# Patient Record
Sex: FEMALE | Race: WHITE | NOT HISPANIC OR LATINO | Employment: FULL TIME | URBAN - METROPOLITAN AREA
[De-identification: names, ages, dates, MRNs, and addresses within clinical notes are randomized per-mention and may not be internally consistent; named-entity substitution may affect disease eponyms.]

---

## 2022-02-25 NOTE — PROGRESS NOTES
"Chief Complaint  Establish Care (Pt states that she would like to dicuss getting labs done, gi issues.) and GI Problem    Subjective          Dominique Huggins presents to [facility]  GI issues since she had her daughter 4 years ago. She reports that she has fecal urgency. She started taking probiotics, and her diarrhea gradually improved.     She would like to get her thyroid and vitamin D checked.  She is feeling more fatigue lately.  She was on thyroid medication while she was pregnant.    GI Problem  The primary symptoms include abdominal pain, nausea, vomiting and diarrhea. Primary symptoms do not include fever, weight loss, hematemesis or hematochezia. The illness began more than 7 days ago. The onset was gradual. The problem has been gradually worsening.   The abdominal pain began more than 2 days ago. The abdominal pain has been unchanged since its onset. The abdominal pain is generalized. The abdominal pain does not radiate. Relieved by: Pepto-Bismal.   Nausea began more than 1 week ago. Associated with: nothing.   The vomiting began more than 2 days ago. Frequency: occasoinal. The emesis contains bilious material.   The diarrhea began more than 1 week ago. The diarrhea is watery. Daily occurrences: few times per week.   The illness is also significant for constipation. The illness does not include bloating. Associated medical issues do not include inflammatory bowel disease, GERD or irritable bowel syndrome.       Objective   Vital Signs:   BP 99/46 (BP Location: Left arm, Patient Position: Sitting)   Pulse 89   Temp 98.1 °F (36.7 °C) (Oral)   Ht 162.6 cm (64\")   Wt 59.7 kg (131 lb 9.6 oz)   SpO2 99% Comment: room air  BMI 22.59 kg/m²     Physical Exam  Constitutional:       General: She is not in acute distress.     Appearance: Normal appearance. She is normal weight.   HENT:      Head: Normocephalic.   Eyes:      Pupils: Pupils are equal, round, and reactive to " light.      Visual Fields: Right eye visual fields normal and left eye visual fields normal.   Neck:      Trachea: Trachea normal.   Cardiovascular:      Rate and Rhythm: Normal rate and regular rhythm.      Heart sounds: Normal heart sounds.   Pulmonary:      Effort: Pulmonary effort is normal.      Breath sounds: Normal breath sounds and air entry.   Abdominal:      General: Abdomen is flat. Bowel sounds are normal. There is no distension.      Palpations: Abdomen is soft. There is no mass.      Tenderness: There is no abdominal tenderness.   Musculoskeletal:      Right lower leg: No edema.      Left lower leg: No edema.   Skin:     General: Skin is warm and dry.   Neurological:      Mental Status: She is alert and oriented to person, place, and time.   Psychiatric:         Mood and Affect: Mood and affect normal.         Behavior: Behavior normal.         Thought Content: Thought content normal.        Result Review :   The following data was reviewed by: ONEYDA Guidry on 02/28/2022:                  Assessment and Plan    Diagnoses and all orders for this visit:    1. Altered bowel habits (Primary)  -     dicyclomine (BENTYL) 20 MG tablet; Take 1 tablet by mouth 3 (Three) Times a Day As Needed (abdominal pain).  Dispense: 90 tablet; Refill: 0  -     Ambulatory Referral to Gastroenterology  -     CBC (No Diff); Future  -     Comprehensive Metabolic Panel; Future    2. Nausea and vomiting, intractability of vomiting not specified, unspecified vomiting type  -     CBC (No Diff); Future  -     Comprehensive Metabolic Panel; Future    3. Other fatigue  -     TSH; Future  -     Vitamin D 25 Hydroxy; Future    4. Screening for cholesterol level  -     Lipid Panel; Future    It sounds like she may have irritable bowel syndrome.  We will give her a trial of dicyclomine and refer her to gastro due to her nausea and vomiting.  Will check CBC, CMP, TSH, vitamin D, and lipid panel.  We will have her return in 1  month to see if dicyclomine is effective.      Follow Up   Return in about 4 weeks (around 3/28/2022).  Patient was given instructions and counseling regarding her condition or for health maintenance advice. Please see specific information pulled into the AVS if appropriate.

## 2022-02-28 ENCOUNTER — LAB (OUTPATIENT)
Dept: LAB | Facility: HOSPITAL | Age: 27
End: 2022-02-28

## 2022-02-28 ENCOUNTER — OFFICE VISIT (OUTPATIENT)
Dept: FAMILY MEDICINE CLINIC | Age: 27
End: 2022-02-28

## 2022-02-28 VITALS
BODY MASS INDEX: 22.47 KG/M2 | SYSTOLIC BLOOD PRESSURE: 99 MMHG | WEIGHT: 131.6 LBS | HEART RATE: 89 BPM | HEIGHT: 64 IN | TEMPERATURE: 98.1 F | DIASTOLIC BLOOD PRESSURE: 46 MMHG | OXYGEN SATURATION: 99 %

## 2022-02-28 DIAGNOSIS — R53.83 OTHER FATIGUE: ICD-10-CM

## 2022-02-28 DIAGNOSIS — Z13.220 SCREENING FOR CHOLESTEROL LEVEL: ICD-10-CM

## 2022-02-28 DIAGNOSIS — R19.4 ALTERED BOWEL HABITS: Primary | ICD-10-CM

## 2022-02-28 DIAGNOSIS — R19.4 ALTERED BOWEL HABITS: ICD-10-CM

## 2022-02-28 DIAGNOSIS — R11.2 NAUSEA AND VOMITING, INTRACTABILITY OF VOMITING NOT SPECIFIED, UNSPECIFIED VOMITING TYPE: ICD-10-CM

## 2022-02-28 PROBLEM — J45.20 MILD INTERMITTENT ASTHMA WITHOUT COMPLICATION: Status: ACTIVE | Noted: 2022-02-28

## 2022-02-28 LAB
25(OH)D3 SERPL-MCNC: 30.2 NG/ML
ALBUMIN SERPL-MCNC: 4.7 G/DL (ref 3.5–5.2)
ALBUMIN/GLOB SERPL: 1.7 G/DL
ALP SERPL-CCNC: 61 U/L (ref 39–117)
ALT SERPL W P-5'-P-CCNC: 8 U/L (ref 1–33)
ANION GAP SERPL CALCULATED.3IONS-SCNC: 7.7 MMOL/L (ref 5–15)
AST SERPL-CCNC: 11 U/L (ref 1–32)
BILIRUB SERPL-MCNC: 0.7 MG/DL (ref 0–1.2)
BUN SERPL-MCNC: 8 MG/DL (ref 6–20)
BUN/CREAT SERPL: 11.8 (ref 7–25)
CALCIUM SPEC-SCNC: 9.5 MG/DL (ref 8.6–10.5)
CHLORIDE SERPL-SCNC: 106 MMOL/L (ref 98–107)
CHOLEST SERPL-MCNC: 150 MG/DL (ref 0–200)
CO2 SERPL-SCNC: 26.3 MMOL/L (ref 22–29)
CREAT SERPL-MCNC: 0.68 MG/DL (ref 0.57–1)
DEPRECATED RDW RBC AUTO: 37.9 FL (ref 37–54)
EGFRCR SERPLBLD CKD-EPI 2021: 122.6 ML/MIN/1.73
ERYTHROCYTE [DISTWIDTH] IN BLOOD BY AUTOMATED COUNT: 11.7 % (ref 12.3–15.4)
GLOBULIN UR ELPH-MCNC: 2.8 GM/DL
GLUCOSE SERPL-MCNC: 93 MG/DL (ref 65–99)
HCT VFR BLD AUTO: 42.9 % (ref 34–46.6)
HDLC SERPL-MCNC: 44 MG/DL (ref 40–60)
HGB BLD-MCNC: 14.1 G/DL (ref 12–15.9)
LDLC SERPL CALC-MCNC: 96 MG/DL (ref 0–100)
LDLC/HDLC SERPL: 2.2 {RATIO}
MCH RBC QN AUTO: 28.5 PG (ref 26.6–33)
MCHC RBC AUTO-ENTMCNC: 32.9 G/DL (ref 31.5–35.7)
MCV RBC AUTO: 86.8 FL (ref 79–97)
PLATELET # BLD AUTO: 276 10*3/MM3 (ref 140–450)
PMV BLD AUTO: 10 FL (ref 6–12)
POTASSIUM SERPL-SCNC: 4.7 MMOL/L (ref 3.5–5.2)
PROT SERPL-MCNC: 7.5 G/DL (ref 6–8.5)
RBC # BLD AUTO: 4.94 10*6/MM3 (ref 3.77–5.28)
SODIUM SERPL-SCNC: 140 MMOL/L (ref 136–145)
TRIGL SERPL-MCNC: 46 MG/DL (ref 0–150)
TSH SERPL DL<=0.05 MIU/L-ACNC: 0.89 UIU/ML (ref 0.27–4.2)
VLDLC SERPL-MCNC: 10 MG/DL (ref 5–40)
WBC NRBC COR # BLD: 4.05 10*3/MM3 (ref 3.4–10.8)

## 2022-02-28 PROCEDURE — 36415 COLL VENOUS BLD VENIPUNCTURE: CPT

## 2022-02-28 PROCEDURE — 99203 OFFICE O/P NEW LOW 30 MIN: CPT | Performed by: NURSE PRACTITIONER

## 2022-02-28 PROCEDURE — 80053 COMPREHEN METABOLIC PANEL: CPT

## 2022-02-28 PROCEDURE — 82306 VITAMIN D 25 HYDROXY: CPT

## 2022-02-28 PROCEDURE — 80061 LIPID PANEL: CPT

## 2022-02-28 PROCEDURE — 85027 COMPLETE CBC AUTOMATED: CPT

## 2022-02-28 PROCEDURE — 84443 ASSAY THYROID STIM HORMONE: CPT

## 2022-02-28 RX ORDER — ALBUTEROL SULFATE 90 UG/1
2 AEROSOL, METERED RESPIRATORY (INHALATION) EVERY 4 HOURS PRN
COMMUNITY

## 2022-02-28 RX ORDER — DIPHENOXYLATE HYDROCHLORIDE AND ATROPINE SULFATE 2.5; .025 MG/1; MG/1
TABLET ORAL DAILY
COMMUNITY

## 2022-02-28 RX ORDER — DICYCLOMINE HCL 20 MG
20 TABLET ORAL 3 TIMES DAILY PRN
Qty: 90 TABLET | Refills: 0 | Status: SHIPPED | OUTPATIENT
Start: 2022-02-28 | End: 2022-04-26 | Stop reason: SDUPTHER

## 2022-02-28 NOTE — PATIENT INSTRUCTIONS
Dicyclomine tablets or capsules  What is this medicine?  DICYCLOMINE (dye MAUREENE eda rice) is used to treat bowel problems including irritable bowel syndrome.  This medicine may be used for other purposes; ask your health care provider or pharmacist if you have questions.  COMMON BRAND NAME(S): Bentyl  What should I tell my health care provider before I take this medicine?  They need to know if you have any of these conditions:  · difficulty passing urine  · esophagus problems or heartburn  · glaucoma  · heart disease, or previous heart attack  · myasthenia gravis  · prostate trouble  · stomach infection, or obstruction  · ulcerative colitis  · an unusual or allergic reaction to dicyclomine, other medicines, foods, dyes, or preservatives  · pregnant or trying to get pregnant  · breast-feeding  How should I use this medicine?  Take this medicine by mouth with a glass of water. Follow the directions on the prescription label. It is best to take this medicine on an empty stomach, 30 minutes to 1 hour before meals. Take your medicine at regular intervals. Do not take your medicine more often than directed.  Talk to your pediatrician regarding the use of this medicine in children. Special care may be needed. While this drug may be prescribed for children as young as 6 months of age for selected conditions, precautions do apply.  Patients over 65 years old may have a stronger reaction and need a smaller dose.  Overdosage: If you think you have taken too much of this medicine contact a poison control center or emergency room at once.  NOTE: This medicine is only for you. Do not share this medicine with others.  What if I miss a dose?  If you miss a dose, take it as soon as you can. If it is almost time for your next dose, take only that dose. Do not take double or extra doses.  What may interact with this medicine?  · amantadine  · antacids  · benztropine  · digoxin  · disopyramide  · medicines for allergies, colds and  breathing difficulties  · medicines for alzheimer's disease  · medicines for anxiety or sleeping problems  · medicines for depression or psychotic disturbances  · medicines for diarrhea  · medicines for pain  · metoclopramide  · tegaserod  This list may not describe all possible interactions. Give your health care provider a list of all the medicines, herbs, non-prescription drugs, or dietary supplements you use. Also tell them if you smoke, drink alcohol, or use illegal drugs. Some items may interact with your medicine.  What should I watch for while using this medicine?  You may get drowsy, dizzy, or have blurred vision. Do not drive, use machinery, or do anything that needs mental alertness until you know how this medicine affects you. To reduce the risk of dizzy or fainting spells, do not sit or stand up quickly, especially if you are an older patient. Alcohol can make you more drowsy, avoid alcoholic drinks.  Stay out of bright light and wear sunglasses if this medicine makes your eyes more sensitive to light.  Avoid extreme heat (hot tubs, saunas). This medicine can cause you to sweat less than normal. Your body temperature could increase to dangerous levels, which may lead to heat stroke.  Antacids can stop this medicine from working. If you get an upset stomach and want to take an antacid, make sure there is an interval of at least 1 to 2 hours before or after you take this medicine.  Your mouth may get dry. Chewing sugarless gum or sucking hard candy, and drinking plenty of water may help. Contact your doctor if the problem does not go away or is severe.  What side effects may I notice from receiving this medicine?  Side effects that you should report to your doctor or health care professional as soon as possible:  · agitation, nervousness, confusion  · difficulty swallowing  · dizziness, drowsiness  · fast or slow heartbeat  · hallucinations  · pain or difficulty passing urine  Side effects that usually do  not require medical attention (report to your doctor or health care professional if they continue or are bothersome):  · constipation  · headache  · nausea or vomiting  · sexual difficulty  This list may not describe all possible side effects. Call your doctor for medical advice about side effects. You may report side effects to FDA at 1-246-XAP-8497.  Where should I keep my medicine?  Keep out of the reach of children.  Store at room temperature below 30 degrees C (86 degrees F). Protect from light. Throw away any unused medicine after the expiration date.  NOTE: This sheet is a summary. It may not cover all possible information. If you have questions about this medicine, talk to your doctor, pharmacist, or health care provider.  © 2021 [employer]vier/Gold Standard (2009-04-07 17:12:34)

## 2022-03-28 ENCOUNTER — OFFICE VISIT (OUTPATIENT)
Dept: GASTROENTEROLOGY | Facility: CLINIC | Age: 27
End: 2022-03-28

## 2022-03-28 VITALS
SYSTOLIC BLOOD PRESSURE: 104 MMHG | BODY MASS INDEX: 22.09 KG/M2 | WEIGHT: 132.6 LBS | DIASTOLIC BLOOD PRESSURE: 62 MMHG | OXYGEN SATURATION: 99 % | HEART RATE: 98 BPM | HEIGHT: 65 IN

## 2022-03-28 DIAGNOSIS — R11.2 NAUSEA AND VOMITING, INTRACTABILITY OF VOMITING NOT SPECIFIED, UNSPECIFIED VOMITING TYPE: ICD-10-CM

## 2022-03-28 DIAGNOSIS — R12 HEARTBURN: ICD-10-CM

## 2022-03-28 DIAGNOSIS — R19.8 ALTERNATING CONSTIPATION AND DIARRHEA: Primary | ICD-10-CM

## 2022-03-28 DIAGNOSIS — T78.1XXA FOOD SENSITIVITY WITH GASTROINTESTINAL SYMPTOMS: ICD-10-CM

## 2022-03-28 PROCEDURE — 99214 OFFICE O/P EST MOD 30 MIN: CPT | Performed by: NURSE PRACTITIONER

## 2022-03-28 RX ORDER — AMITRIPTYLINE HYDROCHLORIDE 10 MG/1
10 TABLET, FILM COATED ORAL NIGHTLY
Qty: 90 TABLET | Refills: 1 | Status: SHIPPED | OUTPATIENT
Start: 2022-03-28 | End: 2022-05-26

## 2022-03-28 RX ORDER — PANTOPRAZOLE SODIUM 20 MG/1
20 TABLET, DELAYED RELEASE ORAL DAILY
Qty: 30 TABLET | Refills: 3 | Status: SHIPPED | OUTPATIENT
Start: 2022-03-28 | End: 2022-05-24

## 2022-03-28 NOTE — PROGRESS NOTES
"Patient Name: Dominique Huggins   Visit Date: 03/28/2022   Patient ID: [id number]  Provider: ONEYDA Champion    Sex: female  Location:  Location Address:  Location Phone: [phone] [facility] [address] [facility] [address]  [facility] [address] [phone]    YOB: 1995      Primary Care Provider Mulu Loera APRN      Referring Provider: Mulu Alba*        Chief Complaint  GI Problem and Heartburn (NEW PATIENT, HEARTBURN,NAUSEA IN THE MORNING WITH BLOATING)    History of Present Illness  Dominique Huggins is a 26 y.o. who presents to [facility] on referral from Mulu Hung for a gastroenterology evaluation of GI Problem and Heartburn (NEW PATIENT, HEARTBURN,NAUSEA IN THE MORNING WITH BLOATING).    Ms. Huggins presents today with GI complaints since being pregnant with her son 2 years ago. She also notes developing covid around 30 weeks pregnant with son. Episodes of diarrhea and constipation, more so diarrhea. PCP prescribed dicylcomine and she has noticed a decrease in diarrhea however reports is has not \"resolved everything\". Admits to being under a lot of stress due to having multiple responsibilities to juggle.  She notices episodes of having trouble sleeping at night due to a \"racing mind\" and the next day her GI issues tend to be more intense.  Expressed that she \"D-Worm herself\" 3 weeks ago as she is a . Denies any hematochezia or melena.    Recently has developed heartburn as well. Episodes multiple times a day. Persistant nausea, worse first thing in the morning with episodes of vomiting. At first she thought she was pregnant however she is has an IUD and has taken multiple negative pregrancy test. Tomato based foods increase episodes. Recalls when she was pregnant with her son that starch based foods caused her tongue to break out. Sore throat comes and goes.  Has found relief with over-the-counter omeprazole.  Denies " "any dysphagia or frequent NSAID usage.  Appetite and weight stable.    Labs Result Review Imaging    Past Medical History:   Diagnosis Date   • PCOS (polycystic ovarian syndrome)        Past Surgical History:   Procedure Laterality Date   • DILATATION AND CURETTAGE           Current Outpatient Medications:   •  albuterol sulfate  (90 Base) MCG/ACT inhaler, Inhale 2 puffs Every 4 (Four) Hours As Needed., Disp: , Rfl:   •  dicyclomine (BENTYL) 20 MG tablet, Take 1 tablet by mouth 3 (Three) Times a Day As Needed (abdominal pain)., Disp: 90 tablet, Rfl: 0  •  multivitamin (THERAGRAN) tablet tablet, Take  by mouth Daily., Disp: , Rfl:   •  VITAMIN D PO, Take  by mouth., Disp: , Rfl:   •  amitriptyline (ELAVIL) 10 MG tablet, Take 1 tablet by mouth Every Night., Disp: 90 tablet, Rfl: 1  •  pantoprazole (PROTONIX) 20 MG EC tablet, Take 1 tablet by mouth Daily., Disp: 30 tablet, Rfl: 3     No Known Allergies    Family History   Problem Relation Age of Onset   • Hyperlipidemia Mother    • Hyperlipidemia Father    • Colon cancer Maternal Grandmother    • Cervical cancer Maternal Grandmother    • Colon cancer Paternal Grandfather         Social History     Social History Narrative    . 2 kids.          Objective     Review of Systems   Constitutional: Negative for appetite change and unexpected weight loss.   Gastrointestinal: Positive for constipation, diarrhea, nausea, vomiting and GERD.        Vital Signs:   /62 (BP Location: Right arm, Patient Position: Sitting, Cuff Size: Large Adult)   Pulse 98   Ht 165.1 cm (65\")   Wt 60.1 kg (132 lb 9.6 oz)   SpO2 99%   BMI 22.07 kg/m²       Physical Exam  Constitutional:       General: She is not in acute distress.     Appearance: Normal appearance. She is well-developed and normal weight.   HENT:      Head: Normocephalic and atraumatic.   Eyes:      Conjunctiva/sclera: Conjunctivae normal.      Pupils: Pupils are equal, round, and reactive to light.    "   Visual Fields: Right eye visual fields normal and left eye visual fields normal.   Cardiovascular:      Rate and Rhythm: Normal rate and regular rhythm.      Heart sounds: Normal heart sounds.   Pulmonary:      Effort: Pulmonary effort is normal. No retractions.      Breath sounds: Normal breath sounds and air entry.   Abdominal:      General: Bowel sounds are normal. There is no distension.      Palpations: Abdomen is soft.      Tenderness: There is no abdominal tenderness.      Comments: No appreciable hepatosplenomegaly or ascites   Musculoskeletal:         General: Normal range of motion.      Cervical back: Normal range of motion and neck supple.   Skin:     General: Skin is warm and dry.   Neurological:      Mental Status: She is alert and oriented to person, place, and time.   Psychiatric:         Mood and Affect: Mood and affect normal.         Behavior: Behavior normal.         Result Review :   The following data was reviewed by: ONEYDA Champion on 03/28/2022:  CBC w/diff    CBC w/Diff 4/13/21 2/28/22   WBC 4.94 4.05   RBC 4.85 4.94   Hemoglobin 13.5 14.1   Hematocrit 41.5 42.9   MCV 85.6 86.8   MCH 27.8 28.5   MCHC 32.5 32.9   RDW 12.5 11.7 (A)   Platelets 281 276   Neutrophil Rel % 62.6    Immature Granulocyte Rel % 0.2    Lymphocyte Rel % 29.1    Monocyte Rel % 6.3    Eosinophil Rel % 1.0    Basophil Rel % 0.8    (A) Abnormal value            CMP    CMP 2/28/22   Glucose 93   BUN 8   Creatinine 0.68   Sodium 140   Potassium 4.7   Chloride 106   Calcium 9.5   Albumin 4.70   Total Bilirubin 0.7   Alkaline Phosphatase 61   AST (SGOT) 11   ALT (SGPT) 8           Lipase   Date Value Ref Range Status   04/13/2021 84 23 - 300 U/L Final     Hepatitis B Surface Ag   Date Value Ref Range Status   11/07/2019 Nonreactive Nonreactive Final     External Hepatitis C Ab   Date Value Ref Range Status   11/07/2019 Nonreactive Nonreactive Final     Comment:     Nonreactive   Antibodies to HCV not detected, does  not exclude the possiblitly of exposure to HCV     Protime   Date Value Ref Range Status   05/19/2020 11.5 10.3 - 13.3 s Final     Comment:     (note)  Anticoagulants may alter the results of Laboratory   Coagulation assays. New-generation anticoagulants such as   direct Thrombin inhibitors (Dabigatran/Pradaxa, Argatroban,   Bivalrudin) and direct/indirect factor Xa inhibitors   (Rivaroxaban/Xarelto,Apixaban/Eliquis, Danaparoid/Orgaran,   Fondaparinux/Arixtra) have been shown to affect the results   of Laboratory Coagulation assays, creating falsely elevated   or decreased values. Correlation with medication history is   advised.     INR   Date Value Ref Range Status   05/19/2020 1.0 INR Final     Comment:     INR Therapeutic Ranges:  Low Intensity Range: 2.0-3.0  High Intensity Range:  3.0-4.5             Assessment and Plan    Diagnoses and all orders for this visit:    1. Alternating constipation and diarrhea (Primary)  -     Clostridium Difficile Toxin - Stool, Per Rectum; Future  -     Enteric Bacterial Panel - Stool, Per Rectum; Future  -     Enteric Parasite Panel - Stool, Per Rectum; Future  -     Fecal Lactoferrin Qual. - Stool, Per Rectum; Future  -     Pancreatic Elastase, Fecal - Stool, Per Rectum; Future    2. Food sensitivity with gastrointestinal symptoms  -     Food Allergy Profile; Future    3. Nausea and vomiting, intractability of vomiting not specified, unspecified vomiting type    4. Heartburn    Other orders  -     pantoprazole (PROTONIX) 20 MG EC tablet; Take 1 tablet by mouth Daily.  Dispense: 30 tablet; Refill: 3  -     amitriptyline (ELAVIL) 10 MG tablet; Take 1 tablet by mouth Every Night.  Dispense: 90 tablet; Refill: 1      * Surgery not found *     Patient would like to hold off on scopes at this time due to financial reasons.  If no change in symptoms or worsening symptoms we will revisit EGD and colonoscopy.    Follow Up   Return in about 3 months (around 6/28/2022).  Patient was  given instructions and counseling regarding her condition or for health maintenance advice. Please see specific information pulled into the AVS if appropriate.

## 2022-04-06 ENCOUNTER — PATIENT ROUNDING (BHMG ONLY) (OUTPATIENT)
Dept: GASTROENTEROLOGY | Facility: CLINIC | Age: 27
End: 2022-04-06

## 2022-04-06 NOTE — PROGRESS NOTES
April 6, 2022    Hello, may I speak with Dominique Huggins?    My name is Pam      I am  with [facility]  [address] [address]  [facility] [address] [phone].    Before we get started may I verify your date of birth? 1995    I am calling to officially welcome you to our practice and ask about your recent visit. Is this a good time to talk? Yes     Tell me about your visit with us. What things went well?  Everything was great     We're always looking for ways to make our patients' experiences even better. Do you have recommendations on ways we may improve? No    Overall were you satisfied with your first visit to our practice? Yes       I appreciate you taking the time to speak with me today. Is there anything else I can do for you? No      Thank you, and have a great day.

## 2022-04-13 ENCOUNTER — TELEPHONE (OUTPATIENT)
Dept: GASTROENTEROLOGY | Facility: CLINIC | Age: 27
End: 2022-04-13

## 2022-04-13 NOTE — TELEPHONE ENCOUNTER
Left message with patient asking for a returned call to follow up on overdue results for labs and stool studies.

## 2022-04-25 NOTE — PROGRESS NOTES
"Chief Complaint  Sexual Problem (Pt c/o low libido since having her son 2 years ago./)    Subjective     {Problem List  Visit Diagnosis   Encounters  Notes  Medications  Labs  Result Review Imaging  Media :23}     Dominique Huggins presents to [facility]  She reports low libido since the birth of her child 2 years ago.  She reports not having desire to have sexual activity with her spouse.  She reports that she used to be more interested in sex than she currently has.  She reports she has a lot of fatigue.  She does have 2 children, which are 4 and 2, and does have a job.  She reports that she should be happy, but she is not as happy as she wants once.    She is needing a refill on her dicyclomine.    Objective   Vital Signs:   BP 99/64 (BP Location: Left arm, Patient Position: Sitting)   Pulse 100   Ht 165.1 cm (65\")   Wt 59.3 kg (130 lb 12.8 oz)   BMI 21.77 kg/m²     Physical Exam  Constitutional:       General: She is not in acute distress.     Appearance: Normal appearance. She is normal weight.   HENT:      Head: Normocephalic.   Eyes:      Pupils: Pupils are equal, round, and reactive to light.      Visual Fields: Right eye visual fields normal and left eye visual fields normal.   Neck:      Trachea: Trachea normal.   Cardiovascular:      Rate and Rhythm: Normal rate and regular rhythm.      Heart sounds: Normal heart sounds.   Pulmonary:      Effort: Pulmonary effort is normal.      Breath sounds: Normal breath sounds and air entry.   Musculoskeletal:      Right lower leg: No edema.      Left lower leg: No edema.   Skin:     General: Skin is warm and dry.   Neurological:      Mental Status: She is alert and oriented to person, place, and time.   Psychiatric:         Mood and Affect: Mood and affect normal.         Behavior: Behavior normal.         Thought Content: Thought content normal.        Result Review :   The following data was reviewed by: Mulu" ONEYDA Loera on 04/26/2022:  Lipid Panel (02/28/2022 10:20)  Comprehensive Metabolic Panel (02/28/2022 10:20)  Vitamin D 25 Hydroxy (02/28/2022 10:20)  TSH (02/28/2022 10:20)                   Assessment and Plan    Diagnoses and all orders for this visit:    1. Low libido (Primary)  Assessment & Plan:  I believe her low libido is due to having a lot of stress in her life, putting a strain on her libido.  She is a mother of 2 young children and has a job.  Also believes she may have mild depression, she reports she is not as happy as she once was, even though she should be.  We will try her on Wellbutrin 150 mg twice daily to see if that will help improve her overall mood and see if it will improve her libido as well.    Orders:  -     buPROPion SR (Wellbutrin SR) 150 MG 12 hr tablet; Take 1 tablet by mouth 2 (Two) Times a Day for 60 days.  Dispense: 120 tablet; Refill: 0    2. Altered bowel habits  -     dicyclomine (BENTYL) 20 MG tablet; Take 1 tablet by mouth 3 (Three) Times a Day As Needed (abdominal pain).  Dispense: 90 tablet; Refill: 1    3. Major depressive disorder, single episode, mild (HCC)  -     buPROPion SR (Wellbutrin SR) 150 MG 12 hr tablet; Take 1 tablet by mouth 2 (Two) Times a Day for 60 days.  Dispense: 120 tablet; Refill: 0        Follow Up   Return in about 6 weeks (around 6/7/2022).  Patient was given instructions and counseling regarding her condition or for health maintenance advice. Please see specific information pulled into the AVS if appropriate.

## 2022-04-26 ENCOUNTER — OFFICE VISIT (OUTPATIENT)
Dept: FAMILY MEDICINE CLINIC | Age: 27
End: 2022-04-26

## 2022-04-26 VITALS
HEIGHT: 65 IN | SYSTOLIC BLOOD PRESSURE: 99 MMHG | WEIGHT: 130.8 LBS | HEART RATE: 100 BPM | DIASTOLIC BLOOD PRESSURE: 64 MMHG | BODY MASS INDEX: 21.79 KG/M2

## 2022-04-26 DIAGNOSIS — F32.0 MAJOR DEPRESSIVE DISORDER, SINGLE EPISODE, MILD: ICD-10-CM

## 2022-04-26 DIAGNOSIS — R19.4 ALTERED BOWEL HABITS: ICD-10-CM

## 2022-04-26 DIAGNOSIS — R68.82 LOW LIBIDO: Primary | ICD-10-CM

## 2022-04-26 PROCEDURE — 99213 OFFICE O/P EST LOW 20 MIN: CPT | Performed by: NURSE PRACTITIONER

## 2022-04-26 RX ORDER — BUPROPION HYDROCHLORIDE 150 MG/1
150 TABLET, EXTENDED RELEASE ORAL 2 TIMES DAILY
Qty: 120 TABLET | Refills: 0 | Status: SHIPPED | OUTPATIENT
Start: 2022-04-26 | End: 2022-06-07 | Stop reason: SDUPTHER

## 2022-04-26 RX ORDER — DICYCLOMINE HCL 20 MG
20 TABLET ORAL 3 TIMES DAILY PRN
Qty: 90 TABLET | Refills: 1 | Status: SHIPPED | OUTPATIENT
Start: 2022-04-26

## 2022-04-26 NOTE — ASSESSMENT & PLAN NOTE
I believe her low libido is due to having a lot of stress in her life, putting a strain on her libido.  She is a mother of 2 young children and has a job.  Also believes she may have mild depression, she reports she is not as happy as she once was, even though she should be.  We will try her on Wellbutrin 150 mg twice daily to see if that will help improve her overall mood and see if it will improve her libido as well.

## 2022-05-23 ENCOUNTER — TELEPHONE (OUTPATIENT)
Dept: GASTROENTEROLOGY | Facility: CLINIC | Age: 27
End: 2022-05-23

## 2022-05-23 NOTE — TELEPHONE ENCOUNTER
Patient called and was requesting a refill of pantoprazole but was requesting to see if she could get her dosage increased first. Please advise

## 2022-05-24 RX ORDER — PANTOPRAZOLE SODIUM 40 MG/1
40 TABLET, DELAYED RELEASE ORAL DAILY
Qty: 90 TABLET | Refills: 1 | Status: SHIPPED | OUTPATIENT
Start: 2022-05-24 | End: 2022-06-30 | Stop reason: SDUPTHER

## 2022-05-26 RX ORDER — AMITRIPTYLINE HYDROCHLORIDE 50 MG/1
50 TABLET, FILM COATED ORAL NIGHTLY
Qty: 90 TABLET | Refills: 1 | Status: SHIPPED | OUTPATIENT
Start: 2022-05-26 | End: 2022-11-29

## 2022-05-26 NOTE — TELEPHONE ENCOUNTER
Patient is requesting a refill of her amitriptyline 10 mg and has been taking double. Patient is requesting an increased dosage of 20 mg or 50 mg. Patient was not requesting to increase Protonix but will keep the new dose of that. Please advise

## 2022-06-01 ENCOUNTER — TELEPHONE (OUTPATIENT)
Dept: GASTROENTEROLOGY | Facility: CLINIC | Age: 27
End: 2022-06-01

## 2022-06-01 NOTE — TELEPHONE ENCOUNTER
Called pt in regards to overdue results, I advised pt we have several labs and Stool studies outstanding, pt states at this time the medication seems to be helping her and she only wishes to do the Food allergy labs, Pt asked for pricing with insurance, I advised pt of Billing number.

## 2022-06-07 ENCOUNTER — OFFICE VISIT (OUTPATIENT)
Dept: FAMILY MEDICINE CLINIC | Age: 27
End: 2022-06-07

## 2022-06-07 VITALS
WEIGHT: 131.4 LBS | SYSTOLIC BLOOD PRESSURE: 106 MMHG | BODY MASS INDEX: 21.89 KG/M2 | DIASTOLIC BLOOD PRESSURE: 71 MMHG | HEIGHT: 65 IN | HEART RATE: 106 BPM

## 2022-06-07 DIAGNOSIS — R68.82 LOW LIBIDO: Primary | ICD-10-CM

## 2022-06-07 DIAGNOSIS — F32.0 MAJOR DEPRESSIVE DISORDER, SINGLE EPISODE, MILD: ICD-10-CM

## 2022-06-07 PROCEDURE — 99213 OFFICE O/P EST LOW 20 MIN: CPT | Performed by: NURSE PRACTITIONER

## 2022-06-07 RX ORDER — BUPROPION HYDROCHLORIDE 150 MG/1
150 TABLET, EXTENDED RELEASE ORAL 2 TIMES DAILY
Qty: 180 TABLET | Refills: 1 | Status: SHIPPED | OUTPATIENT
Start: 2022-06-07 | End: 2022-12-06 | Stop reason: SDUPTHER

## 2022-06-30 RX ORDER — PANTOPRAZOLE SODIUM 20 MG/1
20 TABLET, DELAYED RELEASE ORAL DAILY
Qty: 30 TABLET | Refills: 3 | Status: SHIPPED | OUTPATIENT
Start: 2022-06-30 | End: 2022-12-06

## 2022-07-18 ENCOUNTER — LAB (OUTPATIENT)
Dept: LAB | Facility: HOSPITAL | Age: 27
End: 2022-07-18

## 2022-07-18 ENCOUNTER — OFFICE VISIT (OUTPATIENT)
Dept: GASTROENTEROLOGY | Facility: CLINIC | Age: 27
End: 2022-07-18

## 2022-07-18 VITALS — HEIGHT: 65 IN | WEIGHT: 131 LBS | BODY MASS INDEX: 21.83 KG/M2 | TEMPERATURE: 97.2 F

## 2022-07-18 DIAGNOSIS — R15.2 FECAL URGENCY: ICD-10-CM

## 2022-07-18 DIAGNOSIS — R11.0 NAUSEA: ICD-10-CM

## 2022-07-18 DIAGNOSIS — T78.1XXA FOOD SENSITIVITY WITH GASTROINTESTINAL SYMPTOMS: ICD-10-CM

## 2022-07-18 DIAGNOSIS — K21.9 GASTROESOPHAGEAL REFLUX DISEASE, UNSPECIFIED WHETHER ESOPHAGITIS PRESENT: Primary | ICD-10-CM

## 2022-07-18 PROCEDURE — 86003 ALLG SPEC IGE CRUDE XTRC EA: CPT

## 2022-07-18 PROCEDURE — 36415 COLL VENOUS BLD VENIPUNCTURE: CPT

## 2022-07-18 PROCEDURE — 99214 OFFICE O/P EST MOD 30 MIN: CPT | Performed by: NURSE PRACTITIONER

## 2022-07-18 NOTE — PROGRESS NOTES
Chief Complaint  Heartburn (FOLLOW UP, IBS, HEARTBURN)    History of Present Illness  Dominique Huggins is a 26 y.o. who presents to [facility] for follow up of Heartburn (FOLLOW UP, IBS, HEARTBURN).    Ms. Huggins presents today for f/u IBS-D and GERD. Heartburn is now controlled with Protonix 20 mg once daily.  Reports she is only had 2-3 bouts of nausea since starting PPI. The times she did experience this it has been late at night, without vomiting. Notices hives on tounge with starchy foods, more so when she was preganant.  Appetite weight stable.    Bowel movement now 2x a day, mainly formed stool.  She does feel that stool has been more formed since starting Elavil.  It has also helped her sleep better at night. Greasy foods cause fecal urgency and loose stool. Stomach spasms after as well. Does find relief with dicylomine. Was told this may be her gallbladder. Denies any hematochezia or melena.       Labs Result Review Imaging    Past Medical History:   Diagnosis Date   • PCOS (polycystic ovarian syndrome)        Past Surgical History:   Procedure Laterality Date   • DILATATION AND CURETTAGE         Current Outpatient Medications on File Prior to Visit   Medication Sig Dispense Refill   • albuterol sulfate  (90 Base) MCG/ACT inhaler Inhale 2 puffs Every 4 (Four) Hours As Needed.     • amitriptyline (ELAVIL) 50 MG tablet Take 1 tablet by mouth Every Night. 90 tablet 1   • buPROPion SR (Wellbutrin SR) 150 MG 12 hr tablet Take 1 tablet by mouth 2 (Two) Times a Day for 90 days. 180 tablet 1   • dicyclomine (BENTYL) 20 MG tablet Take 1 tablet by mouth 3 (Three) Times a Day As Needed (abdominal pain). 90 tablet 1   • multivitamin (THERAGRAN) tablet tablet Take  by mouth Daily.     • pantoprazole (PROTONIX) 20 MG EC tablet TAKE 1 TABLET BY MOUTH DAILY 30 tablet 3     No current facility-administered medications on file prior to visit.       Social History     Social  "History Narrative    . 2 kids.          Objective     Review of Systems   Gastrointestinal: Positive for diarrhea, nausea and GERD.        Vital Signs:   Temp 97.2 °F (36.2 °C)   Ht 165.1 cm (65\")   Wt 59.4 kg (131 lb)   BMI 21.80 kg/m²       Physical Exam  Constitutional:       General: She is not in acute distress.     Appearance: Normal appearance. She is well-developed and normal weight.   HENT:      Head: Normocephalic and atraumatic.   Eyes:      Conjunctiva/sclera: Conjunctivae normal.      Pupils: Pupils are equal, round, and reactive to light.      Visual Fields: Right eye visual fields normal and left eye visual fields normal.   Cardiovascular:      Rate and Rhythm: Normal rate and regular rhythm.      Heart sounds: Normal heart sounds.   Pulmonary:      Effort: Pulmonary effort is normal. No retractions.      Breath sounds: Normal breath sounds and air entry.   Abdominal:      General: Bowel sounds are normal. There is no distension.      Palpations: Abdomen is soft.      Tenderness: There is no abdominal tenderness.      Comments: No appreciable hepatosplenomegaly or ascites   Musculoskeletal:         General: Normal range of motion.      Cervical back: Normal range of motion and neck supple.   Skin:     General: Skin is warm and dry.   Neurological:      Mental Status: She is alert and oriented to person, place, and time.   Psychiatric:         Mood and Affect: Mood and affect normal.         Behavior: Behavior normal.         Result Review :   The following data was reviewed by: ONEYDA Champion on 07/18/2022:  CBC w/diff    CBC w/Diff 2/28/22   WBC 4.05   RBC 4.94   Hemoglobin 14.1   Hematocrit 42.9   MCV 86.8   MCH 28.5   MCHC 32.9   RDW 11.7 (A)   Platelets 276   (A) Abnormal value            CMP    CMP 2/28/22   Glucose 93   BUN 8   Creatinine 0.68   Sodium 140   Potassium 4.7   Chloride 106   Calcium 9.5   Albumin 4.70   Total Bilirubin 0.7   Alkaline Phosphatase 61   AST " (SGOT) 11   ALT (SGPT) 8           Lipase   Date Value Ref Range Status   04/13/2021 84 23 - 300 U/L Final     Hepatitis B Surface Ag   Date Value Ref Range Status   11/07/2019 Nonreactive Nonreactive Final     External Hepatitis C Ab   Date Value Ref Range Status   11/07/2019 Nonreactive Nonreactive Final     Comment:     Nonreactive   Antibodies to HCV not detected, does not exclude the possiblitly of exposure to HCV     Protime   Date Value Ref Range Status   05/19/2020 11.5 10.3 - 13.3 s Final     Comment:     (note)  Anticoagulants may alter the results of Laboratory   Coagulation assays. New-generation anticoagulants such as   direct Thrombin inhibitors (Dabigatran/Pradaxa, Argatroban,   Bivalrudin) and direct/indirect factor Xa inhibitors   (Rivaroxaban/Xarelto,Apixaban/Eliquis, Danaparoid/Orgaran,   Fondaparinux/Arixtra) have been shown to affect the results   of Laboratory Coagulation assays, creating falsely elevated   or decreased values. Correlation with medication history is   advised.     INR   Date Value Ref Range Status   05/19/2020 1.0 INR Final     Comment:     INR Therapeutic Ranges:  Low Intensity Range: 2.0-3.0  High Intensity Range:  3.0-4.5             Assessment and Plan    Diagnoses and all orders for this visit:    1. Gastroesophageal reflux disease, unspecified whether esophagitis present (Primary)    2. Fecal urgency  -     NM HIDA Scan With Pharmacological Intervention; Future  -     US Gallbladder; Future    3. Food sensitivity with gastrointestinal symptoms  -     NM HIDA Scan With Pharmacological Intervention; Future  -     US Gallbladder; Future    4. Nausea  -     NM HIDA Scan With Pharmacological Intervention; Future  -     US Gallbladder; Future      * Surgery not found *     Checking gallbladder  Increase Elavil from 25 to 50 mg once daily.    Follow Up   Return in about 4 months (around 11/18/2022).  Patient was given instructions and counseling regarding her condition or for  health maintenance advice. Please see specific information pulled into the AVS if appropriate.

## 2022-07-22 LAB
CLAM IGE QN: <0.1 KU/L
CODFISH IGE QN: <0.1 KU/L
CONV CLASS DESCRIPTION: NORMAL
CORN IGE QN: <0.1 KU/L
COW MILK IGE QN: <0.1 KU/L
EGG WHITE IGE QN: <0.1 KU/L
PEANUT IGE QN: <0.1 KU/L
SCALLOP IGE QN: <0.1 KU/L
SESAME SEED IGE QN: <0.1 KU/L
SHRIMP IGE QN: <0.1 KU/L
SOYBEAN IGE QN: <0.1 KU/L
WALNUT IGE QN: <0.1 KU/L
WHEAT IGE QN: <0.1 KU/L

## 2022-08-01 ENCOUNTER — TELEPHONE (OUTPATIENT)
Dept: GASTROENTEROLOGY | Facility: CLINIC | Age: 27
End: 2022-08-01

## 2022-08-01 NOTE — TELEPHONE ENCOUNTER
"Attempted to Return patient voicemail, patient states she has developed \" new symptoms\" and would like to know if she can get her Ultrasound can be moved to an earlier or date or what other options we can offer. Left voicemail for patient to find out what other symptoms she is having to advise physician. Will follow up.   "

## 2022-08-18 ENCOUNTER — HOSPITAL ENCOUNTER (OUTPATIENT)
Dept: NUCLEAR MEDICINE | Facility: HOSPITAL | Age: 27
Discharge: HOME | End: 2022-08-18

## 2022-08-18 ENCOUNTER — HOSPITAL ENCOUNTER (OUTPATIENT)
Dept: ULTRASOUND IMAGING | Facility: HOSPITAL | Age: 27
Discharge: HOME | End: 2022-08-18

## 2022-08-18 DIAGNOSIS — T78.1XXA FOOD SENSITIVITY WITH GASTROINTESTINAL SYMPTOMS: ICD-10-CM

## 2022-08-18 DIAGNOSIS — R11.0 NAUSEA: ICD-10-CM

## 2022-08-18 DIAGNOSIS — R15.2 FECAL URGENCY: ICD-10-CM

## 2022-08-18 PROCEDURE — 0 TECHNETIUM TETROFOSMIN KIT: Performed by: NURSE PRACTITIONER

## 2022-08-18 PROCEDURE — A9502 TC99M TETROFOSMIN: HCPCS | Performed by: NURSE PRACTITIONER

## 2022-08-18 PROCEDURE — 78227 HEPATOBIL SYST IMAGE W/DRUG: CPT

## 2022-08-18 PROCEDURE — 76705 ECHO EXAM OF ABDOMEN: CPT

## 2022-08-18 RX ORDER — KIT FOR THE PREPARATION OF TECHNETIUM TC 99M MEBROFENIN 45 MG/10ML
1 INJECTION, POWDER, LYOPHILIZED, FOR SOLUTION INTRAVENOUS
Status: DISCONTINUED | OUTPATIENT
Start: 2022-08-18 | End: 2022-08-19 | Stop reason: HOSPADM

## 2022-08-18 RX ADMIN — TETROFOSMIN 1 DOSE: 1.38 INJECTION, POWDER, LYOPHILIZED, FOR SOLUTION INTRAVENOUS at 13:13

## 2022-08-23 ENCOUNTER — TELEPHONE (OUTPATIENT)
Dept: GASTROENTEROLOGY | Facility: CLINIC | Age: 27
End: 2022-08-23

## 2022-08-23 NOTE — TELEPHONE ENCOUNTER
If patient is agreeable I would like to order an EGD and colonoscopy for further evaluation of symptoms.

## 2022-08-23 NOTE — TELEPHONE ENCOUNTER
"Patient called the office and stated that she had received her HIDA scan results and now that the HIDA was normal patient is requesting to know what is to be done next. Patient asked to speak to provider. I offered to make a follow up appointment for patient but, when informed that it could be in November or later, patient stated, \"Yea well this can't wait that long, I'll get a new doctor.\" Patient reported that she is still miserable and can't eat without running to the bathroom. Patient stated that she did some research and is concerned for IBD such as Crohn's. Patient read that IBS and IBD can be treated with Immunosuppressants and can be seen on an endoscopic ultra sound. Patient stated that with her insurance, she cannot afford to get running tests and is requesting possibly a medication instead. Please advise   "

## 2022-08-25 ENCOUNTER — TELEPHONE (OUTPATIENT)
Dept: GASTROENTEROLOGY | Facility: CLINIC | Age: 27
End: 2022-08-25

## 2022-08-25 ENCOUNTER — PREP FOR SURGERY (OUTPATIENT)
Dept: OTHER | Facility: HOSPITAL | Age: 27
End: 2022-08-25

## 2022-08-26 DIAGNOSIS — R15.2 FECAL URGENCY: Primary | ICD-10-CM

## 2022-08-26 DIAGNOSIS — R11.0 NAUSEA: ICD-10-CM

## 2022-08-26 DIAGNOSIS — K21.9 GASTROESOPHAGEAL REFLUX DISEASE, UNSPECIFIED WHETHER ESOPHAGITIS PRESENT: ICD-10-CM

## 2022-08-26 RX ORDER — POLYETHYLENE GLYCOL 3350, SODIUM SULFATE, SODIUM CHLORIDE, POTASSIUM CHLORIDE, ASCORBIC ACID, SODIUM ASCORBATE 140-9-5.2G
1 KIT ORAL TAKE AS DIRECTED
Qty: 1 EACH | Refills: 0 | Status: SHIPPED | OUTPATIENT
Start: 2022-08-26

## 2022-08-26 NOTE — TELEPHONE ENCOUNTER
Patient called and is requesting to get blood work for Crohn's or other diagnosis's as she feels as if blood work could tell what is wrong with her so that she can get medications. Patient does not feel that it is necessary to wait until January for scopes as she feels as if something for IBD can be drawn and possibly tell further what is going on with her. Patient was advised that the provider felt as if scopes were necessary. Patient requested to have a response back from the provider today and was advised that the provider is out of office currently and that it would be Monday before I could give her the provider's reponse. Patient requested to speak to the practice manager. Patient was transferred to the clinical coordinator.

## 2022-08-26 NOTE — TELEPHONE ENCOUNTER
Spoke to patient regarding Venessaneys comment below. I explained to the patient that the questions and concerns that were sent to Soto Gutierrez were read by the provider and the providers recommendation was for and EGD and Colonoscopy.     Patient states that she did a HIDA scan and never received results from this test on 08/18/2022. I explained to her that [employer] releases results before the provider can see them and patient states that she called office numerous times and spoke with someone about this today. However it looks to be that results were submitted by Soto Gutierrez on 08/22/2022, at 09:23am and patient reviewed results on 08/22/2022 at 09:34am with a patient call completed on 08/23/22 when the patient called.       Patient states that she has done research on her own and states that she thinks she has Chron's disease. I informed the patient that [facility] has sent a message to the provider with patients request regarding specific lab testing for Chron's.       Patient requested that her scopes be done elsewhere to which I explained we are unable to do that and the scopes can only be completed at [facility] by our MD's. I offered patient to be placed on a cancellation list.  Patient states that she would just be finding a different doctor and patient then disconnected the line.

## 2022-09-16 ENCOUNTER — TELEPHONE (OUTPATIENT)
Dept: GASTROENTEROLOGY | Facility: CLINIC | Age: 27
End: 2022-09-16

## 2022-09-16 NOTE — TELEPHONE ENCOUNTER
Called patient and left voicemail about cancelling upcoming November appt with Soto Gutierrez. Patient states in 08/26/2022 telephone encounter that she was finding a new physician for her care. Gave my call back number.

## 2022-09-29 ENCOUNTER — OFFICE (AMBULATORY)
Dept: URBAN - METROPOLITAN AREA CLINIC 75 | Facility: CLINIC | Age: 27
End: 2022-09-29

## 2022-09-29 VITALS
OXYGEN SATURATION: 98 % | SYSTOLIC BLOOD PRESSURE: 115 MMHG | WEIGHT: 138.8 LBS | DIASTOLIC BLOOD PRESSURE: 72 MMHG | HEART RATE: 98 BPM | HEIGHT: 65 IN

## 2022-09-29 DIAGNOSIS — K52.9 NONINFECTIVE GASTROENTERITIS AND COLITIS, UNSPECIFIED: ICD-10-CM

## 2022-09-29 DIAGNOSIS — K92.0 HEMATEMESIS: ICD-10-CM

## 2022-09-29 DIAGNOSIS — R10.30 LOWER ABDOMINAL PAIN, UNSPECIFIED: ICD-10-CM

## 2022-09-29 DIAGNOSIS — R14.0 ABDOMINAL DISTENSION (GASEOUS): ICD-10-CM

## 2022-09-29 DIAGNOSIS — K21.9 GASTRO-ESOPHAGEAL REFLUX DISEASE WITHOUT ESOPHAGITIS: ICD-10-CM

## 2022-09-29 DIAGNOSIS — R11.0 NAUSEA: ICD-10-CM

## 2022-09-29 PROCEDURE — 99204 OFFICE O/P NEW MOD 45 MIN: CPT | Performed by: INTERNAL MEDICINE

## 2022-10-24 VITALS
OXYGEN SATURATION: 99 % | HEART RATE: 91 BPM | DIASTOLIC BLOOD PRESSURE: 76 MMHG | RESPIRATION RATE: 15 BRPM | RESPIRATION RATE: 19 BRPM | DIASTOLIC BLOOD PRESSURE: 41 MMHG | TEMPERATURE: 97.7 F | DIASTOLIC BLOOD PRESSURE: 47 MMHG | SYSTOLIC BLOOD PRESSURE: 117 MMHG | HEART RATE: 106 BPM | DIASTOLIC BLOOD PRESSURE: 49 MMHG | OXYGEN SATURATION: 100 % | DIASTOLIC BLOOD PRESSURE: 45 MMHG | RESPIRATION RATE: 17 BRPM | HEART RATE: 100 BPM | OXYGEN SATURATION: 97 % | HEART RATE: 94 BPM | DIASTOLIC BLOOD PRESSURE: 89 MMHG | HEART RATE: 96 BPM | HEIGHT: 65 IN | DIASTOLIC BLOOD PRESSURE: 54 MMHG | RESPIRATION RATE: 20 BRPM | HEART RATE: 82 BPM | DIASTOLIC BLOOD PRESSURE: 68 MMHG | SYSTOLIC BLOOD PRESSURE: 115 MMHG | SYSTOLIC BLOOD PRESSURE: 84 MMHG | DIASTOLIC BLOOD PRESSURE: 52 MMHG | DIASTOLIC BLOOD PRESSURE: 61 MMHG | SYSTOLIC BLOOD PRESSURE: 77 MMHG | HEART RATE: 102 BPM | WEIGHT: 135 LBS | DIASTOLIC BLOOD PRESSURE: 69 MMHG | DIASTOLIC BLOOD PRESSURE: 48 MMHG | DIASTOLIC BLOOD PRESSURE: 43 MMHG | RESPIRATION RATE: 9 BRPM | HEART RATE: 89 BPM | SYSTOLIC BLOOD PRESSURE: 141 MMHG | HEART RATE: 92 BPM | HEART RATE: 97 BPM | SYSTOLIC BLOOD PRESSURE: 96 MMHG | SYSTOLIC BLOOD PRESSURE: 105 MMHG | SYSTOLIC BLOOD PRESSURE: 86 MMHG | RESPIRATION RATE: 30 BRPM | DIASTOLIC BLOOD PRESSURE: 79 MMHG | SYSTOLIC BLOOD PRESSURE: 102 MMHG | RESPIRATION RATE: 18 BRPM | TEMPERATURE: 97.3 F | SYSTOLIC BLOOD PRESSURE: 85 MMHG | SYSTOLIC BLOOD PRESSURE: 91 MMHG | RESPIRATION RATE: 3 BRPM | SYSTOLIC BLOOD PRESSURE: 112 MMHG | SYSTOLIC BLOOD PRESSURE: 81 MMHG | DIASTOLIC BLOOD PRESSURE: 75 MMHG | HEART RATE: 107 BPM | SYSTOLIC BLOOD PRESSURE: 87 MMHG | HEART RATE: 87 BPM

## 2022-10-25 ENCOUNTER — OFFICE (AMBULATORY)
Dept: URBAN - METROPOLITAN AREA PATHOLOGY 4 | Facility: PATHOLOGY | Age: 27
End: 2022-10-25

## 2022-10-25 ENCOUNTER — AMBULATORY SURGICAL CENTER (AMBULATORY)
Dept: URBAN - METROPOLITAN AREA SURGERY 17 | Facility: SURGERY | Age: 27
End: 2022-10-25

## 2022-10-25 DIAGNOSIS — K21.9 GASTRO-ESOPHAGEAL REFLUX DISEASE WITHOUT ESOPHAGITIS: ICD-10-CM

## 2022-10-25 DIAGNOSIS — R14.0 ABDOMINAL DISTENSION (GASEOUS): ICD-10-CM

## 2022-10-25 DIAGNOSIS — R10.30 LOWER ABDOMINAL PAIN, UNSPECIFIED: ICD-10-CM

## 2022-10-25 DIAGNOSIS — K92.0 HEMATEMESIS: ICD-10-CM

## 2022-10-25 DIAGNOSIS — K31.89 OTHER DISEASES OF STOMACH AND DUODENUM: ICD-10-CM

## 2022-10-25 DIAGNOSIS — K29.70 GASTRITIS, UNSPECIFIED, WITHOUT BLEEDING: ICD-10-CM

## 2022-10-25 DIAGNOSIS — K52.9 NONINFECTIVE GASTROENTERITIS AND COLITIS, UNSPECIFIED: ICD-10-CM

## 2022-10-25 DIAGNOSIS — R11.0 NAUSEA: ICD-10-CM

## 2022-10-25 PROBLEM — K22.89 OTHER SPECIFIED DISEASE OF ESOPHAGUS: Status: ACTIVE | Noted: 2022-10-25

## 2022-10-25 LAB
GI HISTOLOGY: A. SELECT: (no result)
GI HISTOLOGY: B. SELECT: (no result)
GI HISTOLOGY: C. UNSPECIFIED: (no result)
GI HISTOLOGY: D. UNSPECIFIED: (no result)
GI HISTOLOGY: E. SELECT: (no result)
GI HISTOLOGY: F. SELECT: (no result)
GI HISTOLOGY: G. SELECT: (no result)
GI HISTOLOGY: PDF REPORT: (no result)

## 2022-10-25 PROCEDURE — 88305 TISSUE EXAM BY PATHOLOGIST: CPT | Performed by: INTERNAL MEDICINE

## 2022-10-25 PROCEDURE — 43239 EGD BIOPSY SINGLE/MULTIPLE: CPT | Performed by: INTERNAL MEDICINE

## 2022-10-25 PROCEDURE — 45380 COLONOSCOPY AND BIOPSY: CPT | Performed by: INTERNAL MEDICINE

## 2022-10-25 PROCEDURE — 88342 IMHCHEM/IMCYTCHM 1ST ANTB: CPT | Performed by: INTERNAL MEDICINE

## 2022-11-21 ENCOUNTER — OFFICE (AMBULATORY)
Dept: URBAN - METROPOLITAN AREA CLINIC 76 | Facility: CLINIC | Age: 27
End: 2022-11-21
Payer: COMMERCIAL

## 2022-11-21 VITALS
SYSTOLIC BLOOD PRESSURE: 103 MMHG | HEART RATE: 97 BPM | HEIGHT: 65 IN | DIASTOLIC BLOOD PRESSURE: 72 MMHG | WEIGHT: 128 LBS

## 2022-11-21 DIAGNOSIS — K52.9 NONINFECTIVE GASTROENTERITIS AND COLITIS, UNSPECIFIED: ICD-10-CM

## 2022-11-21 DIAGNOSIS — R10.30 LOWER ABDOMINAL PAIN, UNSPECIFIED: ICD-10-CM

## 2022-11-21 DIAGNOSIS — K90.9 INTESTINAL MALABSORPTION, UNSPECIFIED: ICD-10-CM

## 2022-11-21 DIAGNOSIS — K21.9 GASTRO-ESOPHAGEAL REFLUX DISEASE WITHOUT ESOPHAGITIS: ICD-10-CM

## 2022-11-21 DIAGNOSIS — K29.50 UNSPECIFIED CHRONIC GASTRITIS WITHOUT BLEEDING: ICD-10-CM

## 2022-11-21 PROCEDURE — 99214 OFFICE O/P EST MOD 30 MIN: CPT | Performed by: INTERNAL MEDICINE

## 2022-11-21 RX ORDER — PANTOPRAZOLE SODIUM 40 MG/1
40 TABLET, DELAYED RELEASE ORAL
Qty: 90 | Refills: 2 | Status: ACTIVE
Start: 2022-11-21

## 2022-11-21 RX ORDER — PANTOPRAZOLE 20 MG/1
TABLET, DELAYED RELEASE ORAL
Qty: 0 | Refills: 0 | Status: COMPLETED
End: 2022-11-21

## 2022-11-21 RX ORDER — CHOLESTYRAMINE 4 G/9G
8 POWDER, FOR SUSPENSION ORAL
Qty: 60 | Refills: 12 | Status: COMPLETED
Start: 2022-11-21 | End: 2023-10-13

## 2022-11-29 RX ORDER — AMITRIPTYLINE HYDROCHLORIDE 50 MG/1
50 TABLET, FILM COATED ORAL NIGHTLY
Qty: 90 TABLET | Refills: 1 | Status: SHIPPED | OUTPATIENT
Start: 2022-11-29

## 2022-12-06 ENCOUNTER — OFFICE VISIT (OUTPATIENT)
Dept: FAMILY MEDICINE CLINIC | Age: 27
End: 2022-12-06

## 2022-12-06 VITALS
SYSTOLIC BLOOD PRESSURE: 103 MMHG | BODY MASS INDEX: 22.42 KG/M2 | DIASTOLIC BLOOD PRESSURE: 68 MMHG | HEIGHT: 65 IN | HEART RATE: 112 BPM | WEIGHT: 134.6 LBS

## 2022-12-06 DIAGNOSIS — F32.0 MAJOR DEPRESSIVE DISORDER, SINGLE EPISODE, MILD: Primary | ICD-10-CM

## 2022-12-06 DIAGNOSIS — R21 RASH: ICD-10-CM

## 2022-12-06 DIAGNOSIS — R68.82 LOW LIBIDO: ICD-10-CM

## 2022-12-06 PROCEDURE — 99214 OFFICE O/P EST MOD 30 MIN: CPT | Performed by: NURSE PRACTITIONER

## 2022-12-06 RX ORDER — PANTOPRAZOLE SODIUM 40 MG/1
TABLET, DELAYED RELEASE ORAL
COMMUNITY
Start: 2022-11-21

## 2022-12-06 RX ORDER — CHOLESTYRAMINE 4 G/9G
POWDER, FOR SUSPENSION ORAL
COMMUNITY
Start: 2022-11-21

## 2022-12-06 RX ORDER — BUPROPION HYDROCHLORIDE 150 MG/1
150 TABLET, EXTENDED RELEASE ORAL 2 TIMES DAILY
Qty: 180 TABLET | Refills: 1 | Status: SHIPPED | OUTPATIENT
Start: 2022-12-06 | End: 2023-02-06 | Stop reason: SDUPTHER

## 2022-12-29 DIAGNOSIS — R68.82 LOW LIBIDO: ICD-10-CM

## 2022-12-29 DIAGNOSIS — F32.0 MAJOR DEPRESSIVE DISORDER, SINGLE EPISODE, MILD: ICD-10-CM

## 2022-12-29 RX ORDER — BUPROPION HYDROCHLORIDE 150 MG/1
TABLET, EXTENDED RELEASE ORAL
Qty: 180 TABLET | Refills: 1 | OUTPATIENT
Start: 2022-12-29

## 2023-01-31 DIAGNOSIS — R68.82 LOW LIBIDO: ICD-10-CM

## 2023-01-31 DIAGNOSIS — F32.0 MAJOR DEPRESSIVE DISORDER, SINGLE EPISODE, MILD: ICD-10-CM

## 2023-01-31 RX ORDER — BUPROPION HYDROCHLORIDE 150 MG/1
150 TABLET, EXTENDED RELEASE ORAL 2 TIMES DAILY
Qty: 180 TABLET | Refills: 1 | OUTPATIENT
Start: 2023-01-31 | End: 2023-05-01

## 2023-01-31 RX ORDER — AMITRIPTYLINE HYDROCHLORIDE 50 MG/1
50 TABLET, FILM COATED ORAL NIGHTLY
Qty: 90 TABLET | Refills: 1 | OUTPATIENT
Start: 2023-01-31

## 2023-01-31 RX ORDER — PANTOPRAZOLE SODIUM 40 MG/1
TABLET, DELAYED RELEASE ORAL
OUTPATIENT
Start: 2023-01-31

## 2023-01-31 NOTE — TELEPHONE ENCOUNTER
Caller: [facility] - [place], [place] - [address] - [phone] [facility] [phone] FX    Relationship: Pharmacy    Best call back number: [phone]    Requested Prescriptions:   Requested Prescriptions     Pending Prescriptions Disp Refills   • pantoprazole (PROTONIX) 40 MG EC tablet     • amitriptyline (ELAVIL) 50 MG tablet 90 tablet 1     Sig: Take 1 tablet by mouth Every Night.   • buPROPion SR (Wellbutrin SR) 150 MG 12 hr tablet 180 tablet 1     Sig: Take 1 tablet by mouth 2 (Two) Times a Day for 90 days.        Pharmacy where request should be sent: [facility] - [place], [place] - [address] - [phone] [facility] [phone] FX       Adam Hurley Rep   01/31/23 15:02 EST

## 2023-02-06 DIAGNOSIS — F32.0 MAJOR DEPRESSIVE DISORDER, SINGLE EPISODE, MILD: ICD-10-CM

## 2023-02-06 DIAGNOSIS — R68.82 LOW LIBIDO: ICD-10-CM

## 2023-02-06 RX ORDER — BUPROPION HYDROCHLORIDE 150 MG/1
150 TABLET, EXTENDED RELEASE ORAL 2 TIMES DAILY
Qty: 180 TABLET | Refills: 1 | Status: SHIPPED | OUTPATIENT
Start: 2023-02-06 | End: 2023-05-07

## 2023-05-03 DIAGNOSIS — R68.82 LOW LIBIDO: ICD-10-CM

## 2023-05-03 DIAGNOSIS — F32.0 MAJOR DEPRESSIVE DISORDER, SINGLE EPISODE, MILD: ICD-10-CM

## 2023-05-03 RX ORDER — BUPROPION HYDROCHLORIDE 150 MG/1
150 TABLET, EXTENDED RELEASE ORAL 2 TIMES DAILY
Qty: 180 TABLET | Refills: 1 | Status: SHIPPED | OUTPATIENT
Start: 2023-05-03

## 2023-08-21 ENCOUNTER — OFFICE VISIT (OUTPATIENT)
Dept: FAMILY MEDICINE CLINIC | Age: 28
End: 2023-08-21
Payer: COMMERCIAL

## 2023-08-21 VITALS
HEART RATE: 107 BPM | TEMPERATURE: 98.9 F | WEIGHT: 136.8 LBS | BODY MASS INDEX: 22.79 KG/M2 | HEIGHT: 65 IN | SYSTOLIC BLOOD PRESSURE: 105 MMHG | DIASTOLIC BLOOD PRESSURE: 65 MMHG | OXYGEN SATURATION: 100 %

## 2023-08-21 DIAGNOSIS — B35.1 ONYCHOMYCOSIS: ICD-10-CM

## 2023-08-21 DIAGNOSIS — J45.21 MILD INTERMITTENT ASTHMA WITH ACUTE EXACERBATION: Primary | ICD-10-CM

## 2023-08-21 DIAGNOSIS — R05.1 ACUTE COUGH: ICD-10-CM

## 2023-08-21 LAB
EXPIRATION DATE: NORMAL
FLUAV AG UPPER RESP QL IA.RAPID: NOT DETECTED
FLUBV AG UPPER RESP QL IA.RAPID: NOT DETECTED
INTERNAL CONTROL: NORMAL
Lab: NORMAL
SARS-COV-2 AG UPPER RESP QL IA.RAPID: NOT DETECTED

## 2023-08-21 PROCEDURE — 87428 SARSCOV & INF VIR A&B AG IA: CPT | Performed by: PHYSICIAN ASSISTANT

## 2023-08-21 PROCEDURE — 99213 OFFICE O/P EST LOW 20 MIN: CPT | Performed by: PHYSICIAN ASSISTANT

## 2023-08-21 RX ORDER — BROMPHENIRAMINE MALEATE, PSEUDOEPHEDRINE HYDROCHLORIDE, AND DEXTROMETHORPHAN HYDROBROMIDE 2; 30; 10 MG/5ML; MG/5ML; MG/5ML
5 SYRUP ORAL 4 TIMES DAILY PRN
Qty: 120 ML | Refills: 0 | Status: SHIPPED | OUTPATIENT
Start: 2023-08-21

## 2023-08-21 RX ORDER — ALBUTEROL SULFATE 2.5 MG/3ML
2.5 SOLUTION RESPIRATORY (INHALATION) EVERY 4 HOURS PRN
Qty: 75 ML | Refills: 0 | Status: SHIPPED | OUTPATIENT
Start: 2023-08-21 | End: 2023-08-28

## 2023-08-21 RX ORDER — PREDNISONE 20 MG/1
40 TABLET ORAL DAILY
Qty: 10 TABLET | Refills: 0 | Status: SHIPPED | OUTPATIENT
Start: 2023-08-21 | End: 2023-08-26

## 2023-08-21 NOTE — PROGRESS NOTES
Diagnoses and all orders for this visit:    1. Mild intermittent asthma with acute exacerbation (Primary)  Comments:  Suspect asthma exacerbation related to viral illness.  Will treat with prednisone and albuterol.  To consider antibiotics if symptoms worsen or fail to improve.  Orders:  -     predniSONE (DELTASONE) 20 MG tablet; Take 2 tablets by mouth Daily for 5 days.  Dispense: 10 tablet; Refill: 0  -     brompheniramine-pseudoephedrine-DM 30-2-10 MG/5ML syrup; Take 5 mL by mouth 4 (Four) Times a Day As Needed for Allergies.  Dispense: 120 mL; Refill: 0  -     albuterol (PROVENTIL) (2.5 MG/3ML) 0.083% nebulizer solution; Take 1 vial by nebulization Every 4 (Four) Hours As Needed for Wheezing for up to 7 days.  Dispense: 75 mL; Refill: 0    2. Acute cough  -     POCT SARS-CoV-2 Antigen ABDIEL + Flu    3. Onychomycosis  -     Ambulatory Referral to Podiatry            Subjective     CHIEF COMPLAINT    Chief Complaint   Patient presents with    Cough     Pt c/o cough, chills, no energy, chest burning/tightness. Ongoing since 8/11/23    Nail Problem     Pt c/o fungus on her (R) big toe.              History of Present Illness  This is a 28-year-old female presenting the clinic complaining of cough and chest tightness for the last 11 days.  The cough makes her head hurt.  She has tried multiple over-the-counter treatments which have not helped.  She does endorse some chills today.  She states her family members and multiple coworkers have been sick with similar symptoms.  She has not taken a COVID test at home.    Additionally she complains of a fungal infection of her toenail for the last 1 to 2 years.  She has been trying to treated at home and states it will improve only to come back a couple months later.          Review of Systems   Constitutional:  Positive for chills. Negative for fatigue and fever.   HENT:  Negative for rhinorrhea and sore throat.    Respiratory:  Positive for cough and chest tightness.  Negative for shortness of breath and wheezing.    Cardiovascular:  Negative for chest pain.   Gastrointestinal:  Negative for abdominal pain, diarrhea, nausea and vomiting.   Musculoskeletal:  Negative for myalgias.   Skin:  Negative for rash.   Neurological:  Positive for headaches.          Past Medical History:   Diagnosis Date    PCOS (polycystic ovarian syndrome)             Past Surgical History:   Procedure Laterality Date    DILATATION AND CURETTAGE              Family History   Problem Relation Age of Onset    Hyperlipidemia Mother     Hyperlipidemia Father     Colon cancer Maternal Grandmother     Cervical cancer Maternal Grandmother     Colon cancer Paternal Grandfather             Social History     Socioeconomic History    Marital status:    Tobacco Use    Smoking status: Never    Smokeless tobacco: Never   Vaping Use    Vaping Use: Never used   Substance and Sexual Activity    Alcohol use: Never    Drug use: Never    Sexual activity: Defer            No Known Allergies         Current Outpatient Medications on File Prior to Visit   Medication Sig Dispense Refill    dicyclomine (BENTYL) 20 MG tablet Take 1 tablet by mouth 3 (Three) Times a Day As Needed (abdominal pain). 90 tablet 1    albuterol sulfate  (90 Base) MCG/ACT inhaler Inhale 2 puffs Every 4 (Four) Hours As Needed. (Patient not taking: Reported on 8/21/2023)      [DISCONTINUED] amitriptyline (ELAVIL) 50 MG tablet TAKE 1 TABLET BY MOUTH EVERY NIGHT (Patient not taking: Reported on 8/21/2023) 90 tablet 1    [DISCONTINUED] buPROPion SR (WELLBUTRIN SR) 150 MG 12 hr tablet Take 1 tablet by mouth 2 (Two) Times a Day. (Patient not taking: Reported on 8/21/2023) 180 tablet 1    [DISCONTINUED] cholestyramine (QUESTRAN) 4 g packet DISSOLVE 1 PACKET IN WATER TWICE DAILY (Patient not taking: Reported on 8/21/2023)      [DISCONTINUED] multivitamin (THERAGRAN) tablet tablet Take  by mouth Daily. (Patient not taking: Reported on 8/21/2023)    "   [DISCONTINUED] pantoprazole (PROTONIX) 40 MG EC tablet TAKE 1 TABLET BY MOUTH EVERY DAY 30 MINUTES BEFORE BREAKFAST (Patient not taking: Reported on 8/21/2023)      [DISCONTINUED] PEG-KCl-NaCl-NaSulf-Na Asc-C (Plenvu) 140 g reconstituted solution solution Take 140 g by mouth Take As Directed. (Patient not taking: Reported on 8/21/2023) 1 each 0    [DISCONTINUED] triamcinolone (KENALOG) 0.1 % ointment Apply 1 application topically to the appropriate area as directed 2 (Two) Times a Day. (Patient not taking: Reported on 8/21/2023) 80 g 0     No current facility-administered medications on file prior to visit.            /65 (BP Location: Right arm, Patient Position: Sitting)   Pulse 107   Temp 98.9 øF (37.2 øC) (Oral)   Ht 165.1 cm (65\")   Wt 62.1 kg (136 lb 12.8 oz)   SpO2 100% Comment: room air  BMI 22.76 kg/mý          Objective     Physical Exam  Vitals and nursing note reviewed.   Constitutional:       General: She is not in acute distress.     Appearance: Normal appearance.   HENT:      Head: Normocephalic and atraumatic.      Right Ear: Tympanic membrane, ear canal and external ear normal.      Left Ear: Tympanic membrane, ear canal and external ear normal.      Nose: No congestion or rhinorrhea.      Mouth/Throat:      Mouth: Mucous membranes are moist.      Pharynx: Oropharynx is clear. No posterior oropharyngeal erythema.   Eyes:      Extraocular Movements: Extraocular movements intact.      Conjunctiva/sclera: Conjunctivae normal.      Pupils: Pupils are equal, round, and reactive to light.   Cardiovascular:      Rate and Rhythm: Normal rate and regular rhythm.      Heart sounds: Normal heart sounds.   Pulmonary:      Effort: Pulmonary effort is normal. No respiratory distress.      Breath sounds: Normal breath sounds. No wheezing, rhonchi or rales.   Musculoskeletal:      Cervical back: Normal range of motion. No rigidity.   Feet:      Right foot:      Skin integrity: Skin integrity normal. "      Toenail Condition: Fungal disease present.  Skin:     General: Skin is warm and dry.   Neurological:      Mental Status: She is alert and oriented to person, place, and time.   Psychiatric:         Mood and Affect: Mood normal.         Behavior: Behavior normal.            Procedures                    Lab Results (last 24 hours)       Procedure Component Value Units Date/Time    POCT SARS-CoV-2 Antigen ABDIEL + Flu [[id number]] Collected: 08/21/23 1645    Specimen: Swab Updated: 08/21/23 1645     SARS Antigen Not Detected     Influenza A Antigen ABDIEL Not Detected     Influenza B Antigen ABDIEL Not Detected     Internal Control Passed     Lot Number [id number]     Expiration Date 12/22/23                  No Radiology Exams Resulted Within Past 24 Hours                    Diagnoses and all orders for this visit:    1. Mild intermittent asthma with acute exacerbation (Primary)  Comments:  Suspect asthma exacerbation related to viral illness.  Will treat with prednisone and albuterol.  To consider antibiotics if symptoms worsen or fail to improve.  Orders:  -     predniSONE (DELTASONE) 20 MG tablet; Take 2 tablets by mouth Daily for 5 days.  Dispense: 10 tablet; Refill: 0  -     brompheniramine-pseudoephedrine-DM 30-2-10 MG/5ML syrup; Take 5 mL by mouth 4 (Four) Times a Day As Needed for Allergies.  Dispense: 120 mL; Refill: 0  -     albuterol (PROVENTIL) (2.5 MG/3ML) 0.083% nebulizer solution; Take 1 vial by nebulization Every 4 (Four) Hours As Needed for Wheezing for up to 7 days.  Dispense: 75 mL; Refill: 0    2. Acute cough  -     POCT SARS-CoV-2 Antigen ABDIEL + Flu    3. Onychomycosis  -     Ambulatory Referral to Podiatry           FOR FULL DISCHARGE INSTRUCTIONS/COMMENTS/HANDOUTS please see the   AVS

## 2023-09-28 ENCOUNTER — OFFICE VISIT (OUTPATIENT)
Dept: SURGERY | Facility: CLINIC | Age: 28
End: 2023-09-28
Payer: COMMERCIAL

## 2023-09-28 VITALS
SYSTOLIC BLOOD PRESSURE: 105 MMHG | BODY MASS INDEX: 22.73 KG/M2 | HEIGHT: 65 IN | WEIGHT: 136.4 LBS | DIASTOLIC BLOOD PRESSURE: 70 MMHG

## 2023-09-28 DIAGNOSIS — R10.31 RIGHT LOWER QUADRANT PAIN: Primary | ICD-10-CM

## 2023-09-28 PROCEDURE — 99203 OFFICE O/P NEW LOW 30 MIN: CPT

## 2023-09-28 RX ORDER — PANTOPRAZOLE SODIUM 40 MG/1
40 TABLET, DELAYED RELEASE ORAL DAILY
COMMUNITY

## 2023-09-28 NOTE — PROGRESS NOTES
ASSESSMENT/PLAN:  28 year old female with cramping, right sided abdominal pain, nausea, and intermittent diarrhea x 2 years. She has had an extensive workup that has essentially been unremarkable, including EGD, colonoscopy, Gallbladder US, HIDA scan, stool studies, and food allergy profile. Endoscopy reports requested. Dr. Hills and I recommended she undergo a CT Abd/Pelvis for further evaluation of her symptoms. Discussed if this is normal, would consider discussing a laparoscopic cholecystectomy as her symptoms could be secondary to her gallbladder, despite negative imaging. She verbalized understanding and wishes to proceed. Order placed. I will call her with the results and make further recommendations at that time.    CC:   Diarrhea, abdominal pain    HPI:    28 year old female who presents today for evaluation of abdominal pain, diarrhea, and nausea. She describes her abdominal pain as localized to the right side of her abdomen and is cramping in nature, more specifically localized to the mid and lower right quadrant. Her symptoms occur predominantly after meals. This is even more noticeable after fried foods. She states she has had intermittent diarrhea.  She reports she has had ongoing GI issues for the past 2 years. She began to notice symptoms during her pregnancies.   She has had a normal GB US and HIDA scan. She reports she has had an EGD and colonoscopy that were both unremarkable. She states she has been diagnosed with IBS. She has had steroids before and feels her abdominal improved some. She has had food allergy testing done that has been normal. She has tried different kinds of diets with no improvement in her symptoms. She reports she has had stool studies done that have been normal.   She believes she has a family history of gallbladder disease in her paternal uncle.      ENDOSCOPY:   EGD and Colonoscopy 10/2022 Dr. Elder: per patient, normal (records requested)     RADIOLOGY:   8/18/2022  "Gallbladder US: no gallstones, wall thickening, or sludge  8/18/2022 HIDA: ejection fraction 65%    LABS:    2/28/2022 CBC and BMP unremarkable  7/18/2022 Food Allergy Profile - normal     SOCIAL HISTORY:   Denies tobacco use  Denies alcohol use    FAMILY HISTORY:    Colorectal cancer: Paternal grandfather, maternal grandmother    PREVIOUS ABDOMINAL SURGERY:  None    OTHER SURGERY:  Dilatation and curettage  Right leg fracture repair     PAST MEDICAL HISTORY:    Depression  Asthma  PCOS    ALLERGIES:   None    MEDICATIONS:   Albuterol  Bentyl  Pantoprazole     ROS:    No cough, chest pain, shortness of air.  All other systems reviewed and negative other than presenting complaints.    PHYSICAL EXAM:   Constitutional: Well-developed, well-nourished, no acute distress  Vital signs:   Ht: 165.1cm (65\")  Wt: 61.9kg (136lb)  BMI: 22.70  Eyes: Conjunctiva normal, sclera nonicteric  ENMT: Hearing grossly normal, oral mucosa moist  Respiratory: Clear to auscultation, normal inspiratory effort  Cardiovascular: Regular rate, no peripheral edema, no jugular venous distention  Gastrointestinal: Soft, tender in the RLQ, no palpable mass, no hepatosplenomegaly, negative for hernia  Skin: Warm, dry, no rash on visualized skin surfaces  Musculoskeletal: Symmetric strength, normal gait  Psychiatric: Alert and oriented ×3, normal affect     Eun Smith PA-C    [facility]   [address], [address]  [place] [address]    [address], [address]  [place] [address]    Office: [phone]  Fax: [phone]   "

## 2023-10-05 ENCOUNTER — HOSPITAL ENCOUNTER (OUTPATIENT)
Dept: CT IMAGING | Facility: HOSPITAL | Age: 28
Discharge: HOME | End: 2023-10-05
Payer: COMMERCIAL

## 2023-10-05 DIAGNOSIS — R10.31 RIGHT LOWER QUADRANT PAIN: ICD-10-CM

## 2023-10-05 PROCEDURE — 74177 CT ABD & PELVIS W/CONTRAST: CPT

## 2023-10-05 PROCEDURE — 25510000001 IOPAMIDOL PER 1 ML

## 2023-10-05 RX ADMIN — IOPAMIDOL 100 ML: 755 INJECTION, SOLUTION INTRAVENOUS at 14:33

## 2023-10-06 ENCOUNTER — TELEPHONE (OUTPATIENT)
Dept: SURGERY | Facility: CLINIC | Age: 28
End: 2023-10-06
Payer: COMMERCIAL

## 2023-10-06 NOTE — TELEPHONE ENCOUNTER
Called patient to review recent CT scan results. No acute findings. She does have a right non-obstructing calculus.    Discussed with her since she has had a negative work-up, would consider proceeding with laparoscopic cholecystectomy. Reviewed the nature of the procedure, benefits and risks, including but not limited to bleeding, infection, damage to surrounding structures, and conversion to an open procedure. Also discussed the risk of postoperative change in bowel habits.      Emphasized that her symptoms may remain the same or possibly worsen after proceeding with cholecystectomy.     She is going to follow-up with her GI doctor first to see if there is any additional management of her IBS that can be achieved prior to proceeding with surgery. She will call back after discussing with them if she decides to proceed with surgery.    Eun Smith PA-C

## 2023-10-13 ENCOUNTER — OFFICE (AMBULATORY)
Dept: URBAN - METROPOLITAN AREA CLINIC 76 | Facility: CLINIC | Age: 28
End: 2023-10-13
Payer: COMMERCIAL

## 2023-10-13 VITALS
WEIGHT: 126 LBS | HEART RATE: 108 BPM | OXYGEN SATURATION: 97 % | SYSTOLIC BLOOD PRESSURE: 100 MMHG | DIASTOLIC BLOOD PRESSURE: 63 MMHG | HEIGHT: 65 IN

## 2023-10-13 DIAGNOSIS — R19.7 DIARRHEA, UNSPECIFIED: ICD-10-CM

## 2023-10-13 DIAGNOSIS — K31.89 OTHER DISEASES OF STOMACH AND DUODENUM: ICD-10-CM

## 2023-10-13 DIAGNOSIS — K21.9 GASTRO-ESOPHAGEAL REFLUX DISEASE WITHOUT ESOPHAGITIS: ICD-10-CM

## 2023-10-13 DIAGNOSIS — K58.9 IRRITABLE BOWEL SYNDROME WITHOUT DIARRHEA: ICD-10-CM

## 2023-10-13 DIAGNOSIS — K29.50 UNSPECIFIED CHRONIC GASTRITIS WITHOUT BLEEDING: ICD-10-CM

## 2023-10-13 PROCEDURE — 99214 OFFICE O/P EST MOD 30 MIN: CPT | Performed by: INTERNAL MEDICINE

## 2023-10-13 RX ORDER — RIFAXIMIN 550 MG/1
TABLET ORAL
Qty: 42 | Refills: 2 | Status: ACTIVE
Start: 2023-10-13

## 2024-03-01 ENCOUNTER — TELEPHONE (OUTPATIENT)
Dept: OBSTETRICS AND GYNECOLOGY | Age: 29
End: 2024-03-01
Payer: COMMERCIAL

## 2024-03-01 NOTE — TELEPHONE ENCOUNTER
Caller: Dominique Huggins    Relationship: Self    Best call back number: [phone]      Who are you requesting to speak with (clinical staff, RILEY PAIZ      What was the call regarding: PATIENT CALLED TO SCHEDULE NEW OB,  PROVIDED FAX NUMBER FOR MED RECS,  PATIENT ADVISED THAT SHE IS LOOKING FOR SECOND OPINION OR A NEW OB.     I

## 2024-03-01 NOTE — TELEPHONE ENCOUNTER
Spoke to pt LMP is 1/15 pos pregnant relays lots of health issues. PT wanting to initiate care here. Requested pt send records.

## 2024-03-04 ENCOUNTER — TELEPHONE (OUTPATIENT)
Dept: OBSTETRICS AND GYNECOLOGY | Age: 29
End: 2024-03-04
Payer: COMMERCIAL

## 2024-03-04 PROBLEM — Z87.59 HISTORY OF MISCARRIAGE: Status: ACTIVE | Noted: 2019-03-19

## 2024-03-04 PROBLEM — R76.0 ANTICARDIOLIPIN ANTIBODY POSITIVE: Status: ACTIVE | Noted: 2019-11-26

## 2024-03-18 ENCOUNTER — OFFICE VISIT (OUTPATIENT)
Dept: OBSTETRICS AND GYNECOLOGY | Age: 29
End: 2024-03-18
Payer: COMMERCIAL

## 2024-03-18 VITALS
HEIGHT: 65 IN | WEIGHT: 139 LBS | SYSTOLIC BLOOD PRESSURE: 120 MMHG | DIASTOLIC BLOOD PRESSURE: 74 MMHG | BODY MASS INDEX: 23.16 KG/M2

## 2024-03-18 DIAGNOSIS — O02.1 MISSED ABORTION: Primary | ICD-10-CM

## 2024-03-18 PROCEDURE — 99203 OFFICE O/P NEW LOW 30 MIN: CPT | Performed by: OBSTETRICS & GYNECOLOGY

## 2024-03-18 RX ORDER — ASPIRIN 81 MG/1
81 TABLET, CHEWABLE ORAL DAILY
COMMUNITY
End: 2024-03-18

## 2024-03-18 RX ORDER — PROGESTERONE 200 MG/1
200 CAPSULE ORAL DAILY
COMMUNITY
Start: 2024-02-22 | End: 2024-03-18

## 2024-03-18 RX ORDER — ENOXAPARIN SODIUM 100 MG/ML
40 INJECTION SUBCUTANEOUS DAILY
COMMUNITY
End: 2024-03-18

## 2024-03-18 RX ORDER — VITAMIN A ACETATE, BETA CAROTENE, ASCORBIC ACID, CHOLECALCIFEROL, .ALPHA.-TOCOPHEROL ACETATE, DL-, THIAMINE MONONITRATE, RIBOFLAVIN, NIACINAMIDE, PYRIDOXINE HYDROCHLORIDE, FOLIC ACID, CYANOCOBALAMIN, CALCIUM CARBONATE, FERROUS FUMARATE, ZINC OXIDE, CUPRIC OXIDE 3080; 12; 120; 400; 1; 1.84; 3; 20; 22; 920; 25; 200; 27; 10; 2 [IU]/1; UG/1; MG/1; [IU]/1; MG/1; MG/1; MG/1; MG/1; MG/1; [IU]/1; MG/1; MG/1; MG/1; MG/1; MG/1
TABLET, FILM COATED ORAL DAILY
COMMUNITY

## 2024-03-18 NOTE — PROGRESS NOTES
"  Chief complaint-bleeding in pregnancy    History of present illness- Patient is a 28 y.o.  who is a new patient to me.  [facility]  She has a history of recurrent miscarriage and a history of positive anticardiolipin antibody with repeat testing negative.  Patient is currently on Lovenox, aspirin and progesterone.  She had an ultrasound at [facility] last week and a heart rate of 68 was noted with a 5-week pole.  Patient notes that she was told she has subchorionic hemorrhage.  She also has PCOS.  She has worked with Dr. Watson and fertility but this was a spontaneous pregnancy.  She is unsure if she would like more children.        /74   Ht 165.1 cm (65\")   Wt 63 kg (139 lb)   LMP 01/15/2024   BMI 23.13 kg/m²   Physical Exam  Constitutional:       Appearance: Normal appearance.   Neurological:      Mental Status: She is alert.   Psychiatric:         Mood and Affect: Mood normal.         Thought Content: Thought content normal.         Judgment: Judgment normal.         Pelvic ultrasound shows 6-week fetal pole with no heart rate.    Diagnoses and all orders for this visit:    1. Missed  (Primary)    We discussed options of observation and expectant management, Cytotec or D&C.  Patient desires expectant management.  She will come back in a week for repeat ultrasound.  Her blood type is O+ so she does not require RhoGAM.  She will discontinue her Lovenox aspirin and progesterone.  She will call with questions.  We briefly discussed future pregnancies.  Patient is not sure at this point if she wants to proceed with further pregnancies.  "

## 2024-03-19 ENCOUNTER — PATIENT ROUNDING (BHMG ONLY) (OUTPATIENT)
Dept: OBSTETRICS AND GYNECOLOGY | Age: 29
End: 2024-03-19
Payer: COMMERCIAL

## 2024-03-19 NOTE — PROGRESS NOTES
A MY CHART MESSAGE HAS [place]EN [place] TO [place] [facility] [place]

## 2024-03-25 ENCOUNTER — OFFICE VISIT (OUTPATIENT)
Dept: OBSTETRICS AND GYNECOLOGY | Age: 29
End: 2024-03-25
Payer: COMMERCIAL

## 2024-03-25 VITALS
SYSTOLIC BLOOD PRESSURE: 106 MMHG | BODY MASS INDEX: 23.66 KG/M2 | WEIGHT: 142 LBS | HEIGHT: 65 IN | DIASTOLIC BLOOD PRESSURE: 68 MMHG

## 2024-03-25 DIAGNOSIS — O03.9 COMPLETE ABORTION: Primary | ICD-10-CM

## 2024-03-25 PROCEDURE — 99213 OFFICE O/P EST LOW 20 MIN: CPT | Performed by: OBSTETRICS & GYNECOLOGY

## 2024-03-25 NOTE — PROGRESS NOTES
"  Chief [id number] after miscarriage    History of present illness- Patient is a 28 y.o.  who was seen for first visit last week.  Patient had a 6-week fetal pole with no cardiac activity.  Patient reports she spontaneously miscarried last week.  Her bleeding is now scant.  She is not having any pain.  She is unsure if she and her  will try for pregnancy again.  She has worked with Dr. Watson's fertility office before.  She does not desire any contraception.  She is due for an annual in December.     /68   Ht 165.1 cm (65\")   Wt 64.4 kg (142 lb)   LMP 01/15/2024   BMI 23.63 kg/m²   Physical Exam  Constitutional:       General: She is not in acute distress.     Appearance: Normal appearance. She is not ill-appearing.   Neurological:      Mental Status: She is alert.   Psychiatric:         Mood and Affect: Mood normal.         Thought Content: Thought content normal.         Judgment: Judgment normal.       Pelvic ultrasound shows retroverted normal-sized uterus.  The previously seen gestational sac with fetal pole is now gone and endometrial stripe is noted.  Ovaries appear normal and there is no free fluid in the pelvis.    Diagnoses and all orders for this visit:    1. Complete  (Primary)    Patient has history of recurrent miscarriage and PCOS.  Encouraged her to work with the fertility clinic if she does proceed future pregnancies.  Encourage prenatal vitamins.  Patient will follow-up with me for annual in December.  [facility].  "

## 2024-04-23 ENCOUNTER — LAB (OUTPATIENT)
Dept: LAB | Facility: HOSPITAL | Age: 29
End: 2024-04-23
Payer: COMMERCIAL

## 2024-04-23 ENCOUNTER — OFFICE VISIT (OUTPATIENT)
Dept: FAMILY MEDICINE CLINIC | Age: 29
End: 2024-04-23
Payer: COMMERCIAL

## 2024-04-23 ENCOUNTER — HOSPITAL ENCOUNTER (OUTPATIENT)
Dept: GENERAL RADIOLOGY | Facility: HOSPITAL | Age: 29
Discharge: HOME | End: 2024-04-23
Payer: COMMERCIAL

## 2024-04-23 VITALS
BODY MASS INDEX: 23.66 KG/M2 | HEART RATE: 101 BPM | WEIGHT: 142 LBS | HEIGHT: 65 IN | SYSTOLIC BLOOD PRESSURE: 102 MMHG | DIASTOLIC BLOOD PRESSURE: 66 MMHG

## 2024-04-23 DIAGNOSIS — M25.511 ACUTE PAIN OF RIGHT SHOULDER: ICD-10-CM

## 2024-04-23 DIAGNOSIS — M25.511 ACUTE PAIN OF RIGHT SHOULDER: Primary | ICD-10-CM

## 2024-04-23 PROCEDURE — 73030 X-RAY EXAM OF SHOULDER: CPT

## 2024-04-23 PROCEDURE — 99213 OFFICE O/P EST LOW 20 MIN: CPT | Performed by: NURSE PRACTITIONER

## 2024-04-23 PROCEDURE — 36415 COLL VENOUS BLD VENIPUNCTURE: CPT

## 2024-04-23 PROCEDURE — 80053 COMPREHEN METABOLIC PANEL: CPT

## 2024-04-23 NOTE — PROGRESS NOTES
"Chief Complaint  Shoulder Pain ((R) )    Subjective          Dominique Huggins presents to [facility]  History of Present Illness  She tore her right rotator cuff when she was 14. In the past few weeks, she has been having right shoulder pain.   Arm Pain   The incident occurred more than 1 week ago. The injury mechanism is unknown. The pain is present in the right shoulder. The quality of the pain is described as aching (dull). The pain radiates to the right neck. The pain is at a severity of 9/10. The pain is severe. The pain has been Constant since the incident. Pertinent negatives include no muscle weakness, numbness or tingling. She has tried acetaminophen (muscle relaxor) for the symptoms. The treatment provided mild relief.       Objective   Vital Signs:   /66 (BP Location: Left arm, Patient Position: Sitting)   Pulse 101   Ht 165.1 cm (65\")   Wt 64.4 kg (142 lb)   BMI 23.63 kg/m²     Physical Exam  Constitutional:       General: She is not in acute distress.     Appearance: Normal appearance. She is normal weight.   HENT:      Head: Normocephalic.   Eyes:      Pupils: Pupils are equal, round, and reactive to light.      Visual Fields: Right eye visual fields normal and left eye visual fields normal.   Neck:      Trachea: Trachea normal.   Cardiovascular:      Rate and Rhythm: Normal rate and regular rhythm.      Heart sounds: Normal heart sounds.   Pulmonary:      Effort: Pulmonary effort is normal.      Breath sounds: Normal breath sounds and air entry.   Musculoskeletal:      Right shoulder: No swelling, deformity or tenderness.      Right lower leg: No edema.      Left lower leg: No edema.   Skin:     General: Skin is warm and dry.   Neurological:      Mental Status: She is alert and oriented to person, place, and time.   Psychiatric:         Mood and Affect: Mood and affect normal.         Behavior: Behavior normal.         Thought Content: Thought content " normal.        Result Review :   The following data was reviewed by: ONEYDA Guidry on 04/23/2024:                  Assessment and Plan    Diagnoses and all orders for this visit:    1. Acute pain of right shoulder (Primary)  -     XR Shoulder 2+ View Right; Future  -     Cancel: Basic metabolic panel; Future  -     diclofenac (VOLTAREN) 50 MG EC tablet; Take 1 tablet by mouth 2 (Two) Times a Day As Needed (shoulder pain).  Dispense: 60 tablet; Refill: 0  -     Ambulatory Referral to Orthopedic Surgery  -     Comprehensive Metabolic Panel; Future    Obtain x-ray of her right shoulder.  Will send in diclofenac.  I recommend that she take it twice daily for 2 weeks then as needed after that.  We have discussed to not take it on empty stomach and to avoid NSAID use while taking it.  Will refer her to Ortho.  She has a history of right rotator cuff repair.      Follow Up   Return if symptoms worsen or fail to improve.  Patient was given instructions and counseling regarding her condition or for health maintenance advice. Please see specific information pulled into the AVS if appropriate.

## 2024-04-24 LAB
ALBUMIN SERPL-MCNC: 4.7 G/DL (ref 3.5–5.2)
ALBUMIN/GLOB SERPL: 1.7 G/DL
ALP SERPL-CCNC: 81 U/L (ref 39–117)
ALT SERPL W P-5'-P-CCNC: 19 U/L (ref 1–33)
ANION GAP SERPL CALCULATED.3IONS-SCNC: 7 MMOL/L (ref 5–15)
AST SERPL-CCNC: 15 U/L (ref 1–32)
BILIRUB SERPL-MCNC: 0.3 MG/DL (ref 0–1.2)
BUN SERPL-MCNC: 8 MG/DL (ref 6–20)
BUN/CREAT SERPL: 11.1 (ref 7–25)
CALCIUM SPEC-SCNC: 9.2 MG/DL (ref 8.6–10.5)
CHLORIDE SERPL-SCNC: 102 MMOL/L (ref 98–107)
CO2 SERPL-SCNC: 28 MMOL/L (ref 22–29)
CREAT SERPL-MCNC: 0.72 MG/DL (ref 0.57–1)
EGFRCR SERPLBLD CKD-EPI 2021: 116.2 ML/MIN/1.73
GLOBULIN UR ELPH-MCNC: 2.7 GM/DL
GLUCOSE SERPL-MCNC: 97 MG/DL (ref 65–99)
POTASSIUM SERPL-SCNC: 5.2 MMOL/L (ref 3.5–5.2)
PROT SERPL-MCNC: 7.4 G/DL (ref 6–8.5)
SODIUM SERPL-SCNC: 137 MMOL/L (ref 136–145)

## 2024-05-21 DIAGNOSIS — M25.511 ACUTE PAIN OF RIGHT SHOULDER: ICD-10-CM

## 2024-05-28 ENCOUNTER — TELEPHONE (OUTPATIENT)
Dept: OBSTETRICS AND GYNECOLOGY | Age: 29
End: 2024-05-28

## 2024-05-28 NOTE — TELEPHONE ENCOUNTER
Usually she will continue with fertility doctor until they clear her.  Keep her appointment with them today.

## 2024-05-28 NOTE — TELEPHONE ENCOUNTER
"Caller: Dominique Huggins \"Sudha\"    Relationship to patient: Self    Best call back number: [phone]    Patient is needing: EST PT OF DR. HAHN- CALLING WITH +UPT. LMP 4/25/2024. PT HAS HAD 5 MISCARRIAGES IN THE PAST. SHE SEE'S A FERTILITY DR. HAS APPT SCHEDULED WITH THEM TODAY 5/28/24. SHE IS NOT FOR SURE IF THEY ARE GOING TO FOLLOW HER FOR THE FIRST 12 WKS SINCE THEY CONCEIVED ON THEIR OWN. PT IS ON SEVERAL MEDICATIONS BLOOD THINNER AND PROGESTERONE ARE SOME. SHE IS ASKING FOR CALL BACK FROM OFFICE TO ADVISE ON SCHEDULING AND SEE IF SHE NEEDS TO CONTINUE HER MEDICATION    "

## 2025-05-16 ENCOUNTER — TELEPHONE (OUTPATIENT)
Dept: OBSTETRICS AND GYNECOLOGY | Age: 30
End: 2025-05-16
Payer: COMMERCIAL

## 2025-05-16 NOTE — TELEPHONE ENCOUNTER
New ob    Patient called to make her 12week release appointment. I have this patient scheduled 6/12 for new ob intake. Please advise if she needs an US , patient has had prior scans from fertility specialist each week.

## 2025-06-12 ENCOUNTER — INITIAL PRENATAL (OUTPATIENT)
Dept: OBSTETRICS AND GYNECOLOGY | Age: 30
End: 2025-06-12
Payer: MEDICAID

## 2025-06-12 VITALS — DIASTOLIC BLOOD PRESSURE: 72 MMHG | BODY MASS INDEX: 22.47 KG/M2 | WEIGHT: 135 LBS | SYSTOLIC BLOOD PRESSURE: 122 MMHG

## 2025-06-12 DIAGNOSIS — Z87.59 HISTORY OF MISCARRIAGE: ICD-10-CM

## 2025-06-12 DIAGNOSIS — Z11.3 SCREENING EXAMINATION FOR VENEREAL DISEASE: ICD-10-CM

## 2025-06-12 DIAGNOSIS — Z13.89 SCREENING FOR BLOOD OR PROTEIN IN URINE: Primary | ICD-10-CM

## 2025-06-12 DIAGNOSIS — Z34.90 PREGNANCY, UNSPECIFIED GESTATIONAL AGE: ICD-10-CM

## 2025-06-12 DIAGNOSIS — N96 RECURRENT PREGNANCY LOSS: ICD-10-CM

## 2025-06-12 PROBLEM — R68.82 LOW LIBIDO: Status: RESOLVED | Noted: 2022-04-26 | Resolved: 2025-06-12

## 2025-06-12 LAB
GLUCOSE UR STRIP-MCNC: NEGATIVE MG/DL
PROT UR STRIP-MCNC: NEGATIVE MG/DL

## 2025-06-12 RX ORDER — ENOXAPARIN SODIUM 100 MG/ML
40 INJECTION SUBCUTANEOUS
Qty: 12 ML | Refills: 7 | Status: SHIPPED | OUTPATIENT
Start: 2025-06-12 | End: 2025-07-12

## 2025-06-12 RX ORDER — PROGESTERONE 200 MG/1
CAPSULE ORAL
COMMUNITY
Start: 2025-04-11

## 2025-06-12 RX ORDER — DIPHENHYDRAMINE HYDROCHLORIDE 25 MG/1
CAPSULE ORAL
COMMUNITY
Start: 2025-04-25

## 2025-06-12 RX ORDER — ASPIRIN 81 MG/1
81 TABLET, CHEWABLE ORAL
COMMUNITY

## 2025-06-12 RX ORDER — ENOXAPARIN SODIUM 100 MG/ML
INJECTION SUBCUTANEOUS
COMMUNITY
Start: 2025-05-09 | End: 2025-06-12 | Stop reason: SDUPTHER

## 2025-06-12 NOTE — ASSESSMENT & PLAN NOTE
Recurrent pregnancy loss-patient has seen [facility] and is on aspirin, Lovenox and progesterone.  She is going to wean off the progesterone.  She has a history of a positive anticardiolipin antibody but repeat testing was negative.  She will need to be converted to heparin at 36 weeks  New OB labs done today  We discussed options for genetic testing.  Patient declined cell free DNA testing.  She did have previous negative carrier testing at [facility].  She may do the AFP only  New OB information and folder were reviewed with the patient.

## 2025-06-12 NOTE — PROGRESS NOTES
Chief Complaint   Patient presents with    Initial Prenatal Visit       HPI: 29 y.o.  at 12w0d by ultrasound done through Dr. Watson's office.  Patient has PCOS and has irregular periods.  She was followed there due to recurrent pregnancy loss.  Patient did not take any fertility medications.  She is currently on progesterone, aspirin and Lovenox.  Her latest pregnancy loss was at 14 weeks.  She was told that she could go ahead and stop her progesterone but she plans to continue it until 16 weeks and wean off.  She has no history of  delivery.  She desires induction of labor at 39 weeks.    Full history is reviewed    Relevant data reviewed:    Last OB US Data (since 2024)       None          Vitals:    25 1340   BP: 122/72   Weight: 61.2 kg (135 lb)     Total weight gain for pregnancy:  Not found.    Cx exam:   / /        Review of systems:   Gen: fatigue  CV:     negative  GI: nausea  :   negative  MS:    negative  Neuro: negative  Pul: negative    Physical Exam  Constitutional:       General: She is not in acute distress.     Appearance: She is not ill-appearing.   Cardiovascular:      Rate and Rhythm: Normal rate and regular rhythm.   Pulmonary:      Breath sounds: Normal breath sounds.   Neurological:      Mental Status: She is alert.   Psychiatric:         Mood and Affect: Mood normal.         Thought Content: Thought content normal.         Judgment: Judgment normal.         A/P  1. Intrauterine pregnancy at 12w0d   2. Pregnancy Risk:  COMPLICATED        Screening for blood or protein in urine    Orders:    POC Urinalysis Dipstick    Urine Culture - Urine, Urine, Random Void    Recurrent pregnancy loss    Orders:    OB Panel With HIV and RPR    Pregnancy, unspecified gestational age         Screening examination for venereal disease    Orders:    Chlamydia trachomatis, Neisseria gonorrhoeae, Trichomonas vaginalis, PCR - Urine, Urine, Clean Catch    History of miscarriage        Recurrent pregnancy loss-patient has seen [facility] and is on aspirin, Lovenox and progesterone.  She is going to wean off the progesterone.  She has a history of a positive anticardiolipin antibody but repeat testing was negative.  She will need to be converted to heparin at 36 weeks  New OB labs done today  We discussed options for genetic testing.  Patient declined cell free DNA testing.  She did have previous negative carrier testing at [facility].  She may do the AFP only  New OB information and folder were reviewed with the patient.      -----------------------  PLAN:   Return in about 4 weeks (around 7/10/2025) for Recheck.    Dillon Smith MD  6/12/2025 17:13 EDT

## 2025-06-14 LAB
ABO GROUP BLD: NORMAL
BACTERIA UR CULT: NO GROWTH
BACTERIA UR CULT: NORMAL
BASOPHILS # BLD AUTO: 0 X10E3/UL (ref 0–0.2)
BASOPHILS NFR BLD AUTO: 0 %
BLD GP AB SCN SERPL QL: NEGATIVE
C TRACH RRNA SPEC QL NAA+PROBE: NEGATIVE
EOSINOPHIL # BLD AUTO: 0.1 X10E3/UL (ref 0–0.4)
EOSINOPHIL NFR BLD AUTO: 2 %
ERYTHROCYTE [DISTWIDTH] IN BLOOD BY AUTOMATED COUNT: 13.1 % (ref 11.7–15.4)
HBV SURFACE AG SERPL QL IA: NEGATIVE
HCT VFR BLD AUTO: 39.3 % (ref 34–46.6)
HCV AB SERPL QL IA: NON REACTIVE
HCV AB SERPL QL IA: NORMAL
HGB BLD-MCNC: 12.7 G/DL (ref 11.1–15.9)
HIV 1+2 AB+HIV1 P24 AG SERPL QL IA: NON REACTIVE
IMM GRANULOCYTES # BLD AUTO: 0 X10E3/UL (ref 0–0.1)
IMM GRANULOCYTES NFR BLD AUTO: 0 %
LYMPHOCYTES # BLD AUTO: 1.6 X10E3/UL (ref 0.7–3.1)
LYMPHOCYTES NFR BLD AUTO: 35 %
MCH RBC QN AUTO: 28.6 PG (ref 26.6–33)
MCHC RBC AUTO-ENTMCNC: 32.3 G/DL (ref 31.5–35.7)
MCV RBC AUTO: 89 FL (ref 79–97)
MONOCYTES # BLD AUTO: 0.4 X10E3/UL (ref 0.1–0.9)
MONOCYTES NFR BLD AUTO: 9 %
N GONORRHOEA RRNA SPEC QL NAA+PROBE: NEGATIVE
NEUTROPHILS # BLD AUTO: 2.6 X10E3/UL (ref 1.4–7)
NEUTROPHILS NFR BLD AUTO: 54 %
OBSERVATION IMP: NORMAL
PLATELET # BLD AUTO: 252 X10E3/UL (ref 150–450)
RBC # BLD AUTO: 4.44 X10E6/UL (ref 3.77–5.28)
RH BLD: POSITIVE
RPR SER QL: NON REACTIVE
RUBV IGG SERPL IA-ACNC: 1.13 INDEX
T VAGINALIS RRNA SPEC QL NAA+PROBE: NEGATIVE
WBC # BLD AUTO: 4.7 X10E3/UL (ref 3.4–10.8)

## 2025-07-08 ENCOUNTER — ROUTINE PRENATAL (OUTPATIENT)
Dept: OBSTETRICS AND GYNECOLOGY | Age: 30
End: 2025-07-08
Payer: MEDICAID

## 2025-07-08 VITALS — SYSTOLIC BLOOD PRESSURE: 108 MMHG | DIASTOLIC BLOOD PRESSURE: 64 MMHG | WEIGHT: 140 LBS | BODY MASS INDEX: 23.3 KG/M2

## 2025-07-08 DIAGNOSIS — Z3A.15 15 WEEKS GESTATION OF PREGNANCY: ICD-10-CM

## 2025-07-08 DIAGNOSIS — Z13.89 SCREENING FOR BLOOD OR PROTEIN IN URINE: Primary | ICD-10-CM

## 2025-07-08 DIAGNOSIS — N96 RECURRENT PREGNANCY LOSS: ICD-10-CM

## 2025-07-08 DIAGNOSIS — M79.604 PAIN IN RIGHT LEG: ICD-10-CM

## 2025-07-08 DIAGNOSIS — R76.0 ANTICARDIOLIPIN ANTIBODY POSITIVE: ICD-10-CM

## 2025-07-08 LAB
GLUCOSE UR STRIP-MCNC: NEGATIVE MG/DL
PROT UR STRIP-MCNC: NEGATIVE MG/DL

## 2025-07-08 NOTE — PROGRESS NOTES
CC- pregnancy    HPI: 29 y.o.  at 15w5d patient notes some intermittent pain behind her right knee.  She has not noticed any swelling.  No vaginal bleeding.  Patient declines cell free DNA testing but would like to do AFP only today.    EXAM:  Last OB US Data (since 2024)       None          Vitals:    25 1049   BP: 108/64   Weight: 63.5 kg (140 lb)     Total weight gain for pregnancy:  2.268 kg (5 lb)  Weight gain for pregnancy is normal  Doppler tones are positive  Handheld ultrasound is used to visualize fetal movement  Bilateral lower extremities show no edema.  Patient reports some mild right calf tenderness with exam.    A/P  1. Intrauterine pregnancy at 15w5d   2. Pregnancy Risk:  COMPLICATED    Diagnoses and all orders for this visit:    1. Screening for blood or protein in urine (Primary)  -     POC Urinalysis Dipstick    2. Pain in right leg  -     Duplex Venous Lower Extremity - Right CAR    3. 15 weeks gestation of pregnancy  -     Alpha Fetoprotein, Maternal    4. Recurrent pregnancy loss  Overview:  Genetic testing done on previous miscarriages has all been negative.      5. Anticardiolipin antibody positive  Overview:  Formatting of this note might be different from the original.   See [facility] labs in media   Review of lab results from Dr. Watson's office revealed that she is positive for anticardiolipin antibody IgM (44 MPL) and one copy of the A222v mutation in the MTHFR geneon 2019.    Low risk NIPT. Negative Lupus anticoagulant and B2 glycoprotein I.       Repeat thrombophilia done on 1/15/20 was negative and normal, including negative IgG and IgM.        -----------------------  Right leg pain-patient will be sent for Doppler today  Recurrent pregnancy loss.  Patient has seen [facility] and is on aspirin and Lovenox.  Okay to stop her progesterone.  Patient will need to be converted to heparin at about 36 weeks  Patient declined cell free DNA testing.  She had previous negative  carrier testing.  She wants to do the AFP only today  Anatomy ultrasound at next visit.      Dillon Smith MD  7/8/2025 11:08 EDT

## 2025-07-10 LAB
AFP INTERP SERPL-IMP: NORMAL
AFP INTERP SERPL-IMP: NORMAL
AFP MOM SERPL: 0.89
AFP SERPL QL: NORMAL
AFP SERPL-MCNC: 29.5 NG/ML
AGE AT DELIVERY: 31.4 YR
GA METHOD: NORMAL
GA: 15.7 WEEKS
IDDM PATIENT QL: NO
MULTIPLE PREGNANCY: NO
NEURAL TUBE DEFECT RISK FETUS: NORMAL %
SERVICE CMNT-IMP: NORMAL

## 2025-07-25 ENCOUNTER — TELEPHONE (OUTPATIENT)
Dept: SURGERY | Facility: OTHER | Age: 30
End: 2025-07-25

## 2025-07-25 ENCOUNTER — ROUTINE PRENATAL (OUTPATIENT)
Dept: OBSTETRICS AND GYNECOLOGY | Age: 30
End: 2025-07-25

## 2025-07-25 VITALS — DIASTOLIC BLOOD PRESSURE: 64 MMHG | BODY MASS INDEX: 23.96 KG/M2 | SYSTOLIC BLOOD PRESSURE: 108 MMHG | WEIGHT: 144 LBS

## 2025-07-25 DIAGNOSIS — R10.2 PELVIC CRAMPING IN ANTEPARTUM PERIOD: ICD-10-CM

## 2025-07-25 DIAGNOSIS — O26.899 PELVIC CRAMPING IN ANTEPARTUM PERIOD: ICD-10-CM

## 2025-07-25 DIAGNOSIS — Z3A.18 18 WEEKS GESTATION OF PREGNANCY: Primary | ICD-10-CM

## 2025-07-25 PROBLEM — O44.40 LOW-LYING PLACENTA: Status: ACTIVE | Noted: 2025-07-25

## 2025-07-25 LAB
BILIRUB BLD-MCNC: NEGATIVE MG/DL
CLARITY, POC: CLEAR
COLOR UR: YELLOW
GLUCOSE UR STRIP-MCNC: NEGATIVE MG/DL
KETONES UR QL: NEGATIVE
LEUKOCYTE EST, POC: NEGATIVE
NITRITE UR-MCNC: NEGATIVE MG/ML
PH UR: 6 [PH] (ref 5–8)
PROT UR STRIP-MCNC: ABNORMAL MG/DL
RBC # UR STRIP: NEGATIVE /UL
SP GR UR: 1.02 (ref 1–1.03)
UROBILINOGEN UR QL: ABNORMAL

## 2025-07-25 NOTE — PROGRESS NOTES
Chief Complaint   Patient presents with    Routine Prenatal Visit     18 weeks, pelvic pressure and pain, US today       HPI: 29 y.o.  at 18w1d presenting for evaluation of pelvic cramping.  Patient states that last night when she was going to bed around midnight she began having intermittent pelvic cramping.    It occurred about every 30 minutes was keeping her from sleeping.  She tried multiple different positions and pain resisted intermittently and so about 4 AM at which time it began to improve.   She denies any vaginal bleeding or leakage of fluid endorses feeling good fetal movement  Not take any Tylenol out of concern for her interaction with her Lovenox.   Denies any fever, chills, N/V.     Relevant data reviewed:  US Ob Transvaginal (2025 15:29)   POC Urinalysis Dipstick (2025 15:37)   Last OB US Data (since 2025)         Value Time User    Placenta location  Posterior 2025  3:43 PM Jaja Norton PA-C          Vitals:    25 1533   BP: 108/64   Weight: 65.3 kg (144 lb)     Total weight gain for pregnancy:  4.082 kg (9 lb)    Cx exam: cl//-4     Review of systems:   Gen: negative  CV:     negative  GI: Slight cramping   :   negative  MS:    negative  Neuro: negative  Pul: negative    Physical Exam  Vitals reviewed.   Constitutional:       Appearance: Normal appearance. She is not ill-appearing.   HENT:      Head: Normocephalic.      Nose: Nose normal.      Mouth/Throat:      Mouth: Mucous membranes are moist.   Cardiovascular:      Rate and Rhythm: Normal rate.      Pulses: Normal pulses.   Pulmonary:      Effort: Pulmonary effort is normal.   Abdominal:      General: There is no distension.      Palpations: Abdomen is soft.      Tenderness: There is no abdominal tenderness.   Genitourinary:     Comments: No abnormal discharge or bleeding noted. Cervix closed on speculum examination  Musculoskeletal:         General: No swelling. Normal range of motion.      Cervical  back: Normal range of motion.   Skin:     General: Skin is warm.   Neurological:      General: No focal deficit present.      Mental Status: She is alert.   Psychiatric:         Mood and Affect: Mood normal.         A/P  1. Intrauterine pregnancy at 18w1d   2. Pregnancy Risk:  Normal        18 weeks gestation of pregnancy    Orders:    POC Urinalysis Dipstick    Pelvic cramping in antepartum period  Patient with intermittent pelvic cramping that occurred last night and has been improving throughout the day.  Denies any dysuria in her urine point-of-care dipstick today was negative low suspicion for urinary tract infection  Cervical length reassuring at 3.98 cm, cervix closed on manual examination  Discussed with patient to present for evaluation if cramping returns and intensifies, any gush of fluid, or any vaginal bleeding  Recommend adequate hydration and Tylenol as needed for cramping            labor was discussed.  Warnings were provided.  -----------------------  PLAN:   Follow up with Dr. Smith, appt scheduled for 2025    Jaja Norton PA-C  2025 16:22 EDT

## 2025-08-08 ENCOUNTER — ROUTINE PRENATAL (OUTPATIENT)
Dept: OBSTETRICS AND GYNECOLOGY | Age: 30
End: 2025-08-08

## 2025-08-08 VITALS — BODY MASS INDEX: 24.46 KG/M2 | DIASTOLIC BLOOD PRESSURE: 68 MMHG | WEIGHT: 147 LBS | SYSTOLIC BLOOD PRESSURE: 110 MMHG

## 2025-08-08 DIAGNOSIS — R76.0 ANTICARDIOLIPIN ANTIBODY POSITIVE: ICD-10-CM

## 2025-08-08 DIAGNOSIS — O44.40 LOW-LYING PLACENTA: ICD-10-CM

## 2025-08-08 DIAGNOSIS — N96 RECURRENT PREGNANCY LOSS: ICD-10-CM

## 2025-08-08 DIAGNOSIS — F32.0 MAJOR DEPRESSIVE DISORDER, SINGLE EPISODE, MILD: ICD-10-CM

## 2025-08-08 DIAGNOSIS — Z3A.20 20 WEEKS GESTATION OF PREGNANCY: Primary | ICD-10-CM

## 2025-08-08 LAB
GLUCOSE UR STRIP-MCNC: NEGATIVE MG/DL
PROT UR STRIP-MCNC: NEGATIVE MG/DL

## 2025-08-08 RX ORDER — ENOXAPARIN SODIUM 100 MG/ML
INJECTION SUBCUTANEOUS
COMMUNITY
Start: 2025-07-25